# Patient Record
Sex: FEMALE | Race: WHITE | Employment: FULL TIME | URBAN - METROPOLITAN AREA
[De-identification: names, ages, dates, MRNs, and addresses within clinical notes are randomized per-mention and may not be internally consistent; named-entity substitution may affect disease eponyms.]

---

## 2017-01-31 ENCOUNTER — OFFICE VISIT (OUTPATIENT)
Dept: FAMILY MEDICINE CLINIC | Age: 52
End: 2017-01-31

## 2017-01-31 VITALS
RESPIRATION RATE: 14 BRPM | WEIGHT: 210 LBS | HEART RATE: 68 BPM | OXYGEN SATURATION: 97 % | SYSTOLIC BLOOD PRESSURE: 106 MMHG | HEIGHT: 61 IN | BODY MASS INDEX: 39.65 KG/M2 | DIASTOLIC BLOOD PRESSURE: 74 MMHG | TEMPERATURE: 99.8 F

## 2017-01-31 DIAGNOSIS — R05.9 COUGH: ICD-10-CM

## 2017-01-31 DIAGNOSIS — E11.9 TYPE 2 DIABETES MELLITUS WITHOUT COMPLICATION, WITHOUT LONG-TERM CURRENT USE OF INSULIN (HCC): Primary | ICD-10-CM

## 2017-01-31 DIAGNOSIS — F32.A DEPRESSION, UNSPECIFIED DEPRESSION TYPE: ICD-10-CM

## 2017-01-31 DIAGNOSIS — I10 ESSENTIAL HYPERTENSION: ICD-10-CM

## 2017-01-31 LAB
BILIRUBIN, POC: NORMAL
BLOOD URINE, POC: NORMAL
CLARITY, POC: NORMAL
COLOR, POC: NORMAL
GLUCOSE URINE, POC: NORMAL
KETONES, POC: NORMAL
LEUKOCYTE EST, POC: NORMAL
NITRITE, POC: NORMAL
PH, POC: 5.5
PROTEIN, POC: NORMAL
SPECIFIC GRAVITY, POC: 1.02
UROBILINOGEN, POC: NORMAL

## 2017-01-31 PROCEDURE — G8419 CALC BMI OUT NRM PARAM NOF/U: HCPCS | Performed by: FAMILY MEDICINE

## 2017-01-31 PROCEDURE — 3046F HEMOGLOBIN A1C LEVEL >9.0%: CPT | Performed by: FAMILY MEDICINE

## 2017-01-31 PROCEDURE — G8427 DOCREV CUR MEDS BY ELIG CLIN: HCPCS | Performed by: FAMILY MEDICINE

## 2017-01-31 PROCEDURE — 3017F COLORECTAL CA SCREEN DOC REV: CPT | Performed by: FAMILY MEDICINE

## 2017-01-31 PROCEDURE — 81003 URINALYSIS AUTO W/O SCOPE: CPT | Performed by: FAMILY MEDICINE

## 2017-01-31 PROCEDURE — G8484 FLU IMMUNIZE NO ADMIN: HCPCS | Performed by: FAMILY MEDICINE

## 2017-01-31 PROCEDURE — 99214 OFFICE O/P EST MOD 30 MIN: CPT | Performed by: FAMILY MEDICINE

## 2017-01-31 PROCEDURE — 1036F TOBACCO NON-USER: CPT | Performed by: FAMILY MEDICINE

## 2017-01-31 PROCEDURE — 3014F SCREEN MAMMO DOC REV: CPT | Performed by: FAMILY MEDICINE

## 2017-03-06 ENCOUNTER — OFFICE VISIT (OUTPATIENT)
Dept: FAMILY MEDICINE CLINIC | Age: 52
End: 2017-03-06

## 2017-03-06 VITALS
TEMPERATURE: 97.6 F | HEIGHT: 63 IN | RESPIRATION RATE: 16 BRPM | DIASTOLIC BLOOD PRESSURE: 72 MMHG | BODY MASS INDEX: 37.21 KG/M2 | HEART RATE: 68 BPM | WEIGHT: 210 LBS | SYSTOLIC BLOOD PRESSURE: 132 MMHG

## 2017-03-06 DIAGNOSIS — I10 ESSENTIAL HYPERTENSION: ICD-10-CM

## 2017-03-06 DIAGNOSIS — F32.A DEPRESSION, UNSPECIFIED DEPRESSION TYPE: ICD-10-CM

## 2017-03-06 DIAGNOSIS — Z12.31 VISIT FOR SCREENING MAMMOGRAM: ICD-10-CM

## 2017-03-06 DIAGNOSIS — E11.9 TYPE 2 DIABETES MELLITUS WITHOUT COMPLICATION, WITHOUT LONG-TERM CURRENT USE OF INSULIN (HCC): Primary | ICD-10-CM

## 2017-03-06 PROCEDURE — G8419 CALC BMI OUT NRM PARAM NOF/U: HCPCS | Performed by: FAMILY MEDICINE

## 2017-03-06 PROCEDURE — G8427 DOCREV CUR MEDS BY ELIG CLIN: HCPCS | Performed by: FAMILY MEDICINE

## 2017-03-06 PROCEDURE — 99214 OFFICE O/P EST MOD 30 MIN: CPT | Performed by: FAMILY MEDICINE

## 2017-03-06 PROCEDURE — 3014F SCREEN MAMMO DOC REV: CPT | Performed by: FAMILY MEDICINE

## 2017-03-06 PROCEDURE — G8484 FLU IMMUNIZE NO ADMIN: HCPCS | Performed by: FAMILY MEDICINE

## 2017-03-06 PROCEDURE — 3044F HG A1C LEVEL LT 7.0%: CPT | Performed by: FAMILY MEDICINE

## 2017-03-06 PROCEDURE — 3017F COLORECTAL CA SCREEN DOC REV: CPT | Performed by: FAMILY MEDICINE

## 2017-03-06 PROCEDURE — 1036F TOBACCO NON-USER: CPT | Performed by: FAMILY MEDICINE

## 2017-03-13 RX ORDER — AMITRIPTYLINE HYDROCHLORIDE 10 MG/1
10 TABLET, FILM COATED ORAL NIGHTLY
Qty: 30 TABLET | Refills: 3 | Status: SHIPPED | OUTPATIENT
Start: 2017-03-13 | End: 2017-05-09 | Stop reason: SDUPTHER

## 2017-03-13 RX ORDER — TRIAMTERENE AND HYDROCHLOROTHIAZIDE 37.5; 25 MG/1; MG/1
1 TABLET ORAL DAILY
Qty: 30 TABLET | Refills: 3 | Status: SHIPPED | OUTPATIENT
Start: 2017-03-13 | End: 2017-05-09 | Stop reason: SDUPTHER

## 2017-03-13 RX ORDER — CITALOPRAM 20 MG/1
20 TABLET ORAL DAILY
Qty: 30 TABLET | Refills: 3 | Status: SHIPPED | OUTPATIENT
Start: 2017-03-13 | End: 2017-05-09 | Stop reason: SDUPTHER

## 2017-03-21 ENCOUNTER — HOSPITAL ENCOUNTER (OUTPATIENT)
Dept: WOMENS IMAGING | Age: 52
Discharge: HOME | End: 2017-03-21
Payer: COMMERCIAL

## 2017-03-21 DIAGNOSIS — Z12.31 VISIT FOR SCREENING MAMMOGRAM: ICD-10-CM

## 2017-03-21 PROCEDURE — G0202 SCR MAMMO BI INCL CAD: HCPCS

## 2017-04-07 RX ORDER — GLIPIZIDE 2.5 MG/1
2.5 TABLET, EXTENDED RELEASE ORAL DAILY
Qty: 30 TABLET | Refills: 3 | Status: SHIPPED | OUTPATIENT
Start: 2017-04-07 | End: 2017-06-19 | Stop reason: DRUGHIGH

## 2017-04-18 ENCOUNTER — TELEPHONE (OUTPATIENT)
Dept: DIABETES SERVICES | Age: 52
End: 2017-04-18

## 2017-05-09 RX ORDER — AMITRIPTYLINE HYDROCHLORIDE 10 MG/1
10 TABLET, FILM COATED ORAL NIGHTLY
Qty: 30 TABLET | Refills: 3 | Status: SHIPPED | OUTPATIENT
Start: 2017-05-09 | End: 2017-08-17 | Stop reason: SDUPTHER

## 2017-05-09 RX ORDER — TRIAMTERENE AND HYDROCHLOROTHIAZIDE 37.5; 25 MG/1; MG/1
1 TABLET ORAL DAILY
Qty: 30 TABLET | Refills: 3 | Status: SHIPPED | OUTPATIENT
Start: 2017-05-09 | End: 2017-08-17 | Stop reason: SDUPTHER

## 2017-05-09 RX ORDER — CITALOPRAM 20 MG/1
20 TABLET ORAL DAILY
Qty: 30 TABLET | Refills: 3 | Status: SHIPPED | OUTPATIENT
Start: 2017-05-09 | End: 2017-09-08 | Stop reason: SDUPTHER

## 2017-05-09 RX ORDER — PANTOPRAZOLE SODIUM 40 MG/1
40 TABLET, DELAYED RELEASE ORAL DAILY
Qty: 30 TABLET | Refills: 3 | Status: SHIPPED | OUTPATIENT
Start: 2017-05-09 | End: 2017-08-17 | Stop reason: SDUPTHER

## 2017-05-09 RX ORDER — PROPRANOLOL HYDROCHLORIDE 20 MG/1
20 TABLET ORAL 2 TIMES DAILY
Qty: 180 TABLET | Refills: 1 | Status: SHIPPED | OUTPATIENT
Start: 2017-05-09 | End: 2017-09-08 | Stop reason: SDUPTHER

## 2017-06-02 DIAGNOSIS — I10 ESSENTIAL HYPERTENSION: Primary | ICD-10-CM

## 2017-06-02 DIAGNOSIS — E78.2 MIXED HYPERLIPIDEMIA: ICD-10-CM

## 2017-06-02 DIAGNOSIS — E11.9 TYPE 2 DIABETES MELLITUS WITHOUT COMPLICATION, WITHOUT LONG-TERM CURRENT USE OF INSULIN (HCC): ICD-10-CM

## 2017-06-05 DIAGNOSIS — E78.2 MIXED HYPERLIPIDEMIA: ICD-10-CM

## 2017-06-05 DIAGNOSIS — I10 ESSENTIAL HYPERTENSION: ICD-10-CM

## 2017-06-05 DIAGNOSIS — E11.9 TYPE 2 DIABETES MELLITUS WITHOUT COMPLICATION, WITHOUT LONG-TERM CURRENT USE OF INSULIN (HCC): ICD-10-CM

## 2017-06-05 LAB
ALBUMIN SERPL-MCNC: 3.9 G/DL (ref 3.9–4.9)
ALP BLD-CCNC: 85 U/L (ref 40–130)
ALT SERPL-CCNC: 26 U/L (ref 0–33)
ANION GAP SERPL CALCULATED.3IONS-SCNC: 14 MEQ/L (ref 7–13)
AST SERPL-CCNC: 21 U/L (ref 0–35)
BILIRUB SERPL-MCNC: 0.3 MG/DL (ref 0–1.2)
BUN BLDV-MCNC: 18 MG/DL (ref 6–20)
CALCIUM SERPL-MCNC: 9.2 MG/DL (ref 8.6–10.2)
CHLORIDE BLD-SCNC: 98 MEQ/L (ref 98–107)
CHOLESTEROL, TOTAL: 157 MG/DL (ref 0–199)
CO2: 26 MEQ/L (ref 22–29)
CREAT SERPL-MCNC: 0.77 MG/DL (ref 0.5–0.9)
GFR AFRICAN AMERICAN: >60
GFR NON-AFRICAN AMERICAN: >60
GLOBULIN: 2.6 G/DL (ref 2.3–3.5)
GLUCOSE BLD-MCNC: 208 MG/DL (ref 74–109)
HBA1C MFR BLD: 7.4 % (ref 4.8–5.9)
HDLC SERPL-MCNC: 38 MG/DL (ref 40–59)
LDL CHOLESTEROL CALCULATED: 86 MG/DL (ref 0–129)
POTASSIUM SERPL-SCNC: 3.8 MEQ/L (ref 3.5–5.1)
SODIUM BLD-SCNC: 138 MEQ/L (ref 132–144)
TOTAL PROTEIN: 6.5 G/DL (ref 6.4–8.1)
TRIGL SERPL-MCNC: 167 MG/DL (ref 0–200)

## 2017-06-19 ENCOUNTER — OFFICE VISIT (OUTPATIENT)
Dept: FAMILY MEDICINE CLINIC | Age: 52
End: 2017-06-19

## 2017-06-19 VITALS
HEIGHT: 63 IN | SYSTOLIC BLOOD PRESSURE: 116 MMHG | HEART RATE: 70 BPM | RESPIRATION RATE: 16 BRPM | TEMPERATURE: 98.2 F | DIASTOLIC BLOOD PRESSURE: 62 MMHG

## 2017-06-19 DIAGNOSIS — Z12.11 COLON CANCER SCREENING: ICD-10-CM

## 2017-06-19 DIAGNOSIS — F32.A DEPRESSION, UNSPECIFIED DEPRESSION TYPE: ICD-10-CM

## 2017-06-19 DIAGNOSIS — R06.83 SNORING: ICD-10-CM

## 2017-06-19 DIAGNOSIS — E11.9 TYPE 2 DIABETES MELLITUS WITHOUT COMPLICATION, WITHOUT LONG-TERM CURRENT USE OF INSULIN (HCC): Primary | ICD-10-CM

## 2017-06-19 DIAGNOSIS — I10 ESSENTIAL HYPERTENSION: ICD-10-CM

## 2017-06-19 PROCEDURE — 3017F COLORECTAL CA SCREEN DOC REV: CPT | Performed by: FAMILY MEDICINE

## 2017-06-19 PROCEDURE — G8427 DOCREV CUR MEDS BY ELIG CLIN: HCPCS | Performed by: FAMILY MEDICINE

## 2017-06-19 PROCEDURE — 99214 OFFICE O/P EST MOD 30 MIN: CPT | Performed by: FAMILY MEDICINE

## 2017-06-19 PROCEDURE — 3014F SCREEN MAMMO DOC REV: CPT | Performed by: FAMILY MEDICINE

## 2017-06-19 PROCEDURE — G8417 CALC BMI ABV UP PARAM F/U: HCPCS | Performed by: FAMILY MEDICINE

## 2017-06-19 PROCEDURE — 1036F TOBACCO NON-USER: CPT | Performed by: FAMILY MEDICINE

## 2017-06-19 PROCEDURE — 3046F HEMOGLOBIN A1C LEVEL >9.0%: CPT | Performed by: FAMILY MEDICINE

## 2017-06-19 RX ORDER — GLIPIZIDE 5 MG/1
5 TABLET ORAL DAILY
Qty: 90 TABLET | Refills: 1 | Status: SHIPPED | OUTPATIENT
Start: 2017-06-19 | End: 2017-09-08 | Stop reason: SDUPTHER

## 2017-07-14 ENCOUNTER — HOSPITAL ENCOUNTER (EMERGENCY)
Age: 52
Discharge: HOME | End: 2017-07-14
Attending: EMERGENCY MEDICINE
Payer: COMMERCIAL

## 2017-07-14 VITALS
RESPIRATION RATE: 16 BRPM | BODY MASS INDEX: 39.38 KG/M2 | HEART RATE: 71 BPM | WEIGHT: 214 LBS | DIASTOLIC BLOOD PRESSURE: 81 MMHG | OXYGEN SATURATION: 97 % | HEIGHT: 62 IN | TEMPERATURE: 98.9 F | SYSTOLIC BLOOD PRESSURE: 134 MMHG

## 2017-07-14 DIAGNOSIS — T78.40XA ALLERGIC REACTION, INITIAL ENCOUNTER: Primary | ICD-10-CM

## 2017-07-14 PROCEDURE — 99282 EMERGENCY DEPT VISIT SF MDM: CPT

## 2017-07-14 PROCEDURE — 6370000000 HC RX 637 (ALT 250 FOR IP): Performed by: EMERGENCY MEDICINE

## 2017-07-14 RX ORDER — PREDNISONE 20 MG/1
40 TABLET ORAL DAILY
Qty: 8 TABLET | Refills: 0 | Status: SHIPPED | OUTPATIENT
Start: 2017-07-14 | End: 2017-07-18

## 2017-07-14 RX ORDER — CETIRIZINE HYDROCHLORIDE 10 MG/1
10 TABLET ORAL ONCE
Status: COMPLETED | OUTPATIENT
Start: 2017-07-14 | End: 2017-07-14

## 2017-07-14 RX ORDER — PREDNISONE 20 MG/1
40 TABLET ORAL ONCE
Status: COMPLETED | OUTPATIENT
Start: 2017-07-14 | End: 2017-07-14

## 2017-07-14 RX ADMIN — CETIRIZINE HYDROCHLORIDE 10 MG: 10 TABLET, FILM COATED ORAL at 12:41

## 2017-07-14 RX ADMIN — PREDNISONE 40 MG: 20 TABLET ORAL at 12:41

## 2017-07-14 ASSESSMENT — ENCOUNTER SYMPTOMS
FACIAL SWELLING: 0
VOMITING: 0
CHEST TIGHTNESS: 0
SORE THROAT: 0
SHORTNESS OF BREATH: 0
VOICE CHANGE: 0
TROUBLE SWALLOWING: 0
ABDOMINAL PAIN: 0
EYE PAIN: 0
NAUSEA: 0

## 2017-07-14 ASSESSMENT — PAIN DESCRIPTION - ORIENTATION: ORIENTATION: RIGHT;LEFT

## 2017-07-14 ASSESSMENT — PAIN DESCRIPTION - LOCATION: LOCATION: HAND

## 2017-07-14 ASSESSMENT — PAIN SCALES - GENERAL: PAINLEVEL_OUTOF10: 5

## 2017-07-14 ASSESSMENT — PAIN DESCRIPTION - PAIN TYPE: TYPE: ACUTE PAIN

## 2017-08-15 ENCOUNTER — PATIENT MESSAGE (OUTPATIENT)
Dept: FAMILY MEDICINE CLINIC | Age: 52
End: 2017-08-15

## 2017-08-17 ENCOUNTER — TELEPHONE (OUTPATIENT)
Dept: FAMILY MEDICINE CLINIC | Age: 52
End: 2017-08-17

## 2017-08-17 RX ORDER — PANTOPRAZOLE SODIUM 40 MG/1
TABLET, DELAYED RELEASE ORAL
Qty: 30 TABLET | Refills: 0 | Status: SHIPPED | OUTPATIENT
Start: 2017-08-17 | End: 2017-09-06 | Stop reason: SDUPTHER

## 2017-08-17 RX ORDER — TRIAMTERENE AND HYDROCHLOROTHIAZIDE 37.5; 25 MG/1; MG/1
TABLET ORAL
Qty: 30 TABLET | Refills: 5 | Status: SHIPPED | OUTPATIENT
Start: 2017-08-17 | End: 2017-09-08 | Stop reason: SDUPTHER

## 2017-08-17 RX ORDER — AMITRIPTYLINE HYDROCHLORIDE 10 MG/1
TABLET, FILM COATED ORAL
Qty: 30 TABLET | Refills: 0 | Status: SHIPPED | OUTPATIENT
Start: 2017-08-17 | End: 2017-09-06 | Stop reason: SDUPTHER

## 2017-08-21 ENCOUNTER — TELEPHONE (OUTPATIENT)
Dept: FAMILY MEDICINE CLINIC | Age: 52
End: 2017-08-21

## 2017-08-21 DIAGNOSIS — E11.9 TYPE 2 DIABETES MELLITUS WITHOUT COMPLICATION, WITHOUT LONG-TERM CURRENT USE OF INSULIN (HCC): Primary | ICD-10-CM

## 2017-08-21 DIAGNOSIS — E11.9 TYPE 2 DIABETES MELLITUS WITHOUT COMPLICATION, WITHOUT LONG-TERM CURRENT USE OF INSULIN (HCC): ICD-10-CM

## 2017-08-21 RX ORDER — LANCETS 30 GAUGE
EACH MISCELLANEOUS
Qty: 100 EACH | Refills: 3 | Status: SHIPPED | OUTPATIENT
Start: 2017-08-21 | End: 2017-08-22

## 2017-08-22 ENCOUNTER — PATIENT MESSAGE (OUTPATIENT)
Dept: FAMILY MEDICINE CLINIC | Age: 52
End: 2017-08-22

## 2017-08-22 DIAGNOSIS — E11.9 TYPE 2 DIABETES MELLITUS WITHOUT COMPLICATION, WITHOUT LONG-TERM CURRENT USE OF INSULIN (HCC): Primary | ICD-10-CM

## 2017-09-06 RX ORDER — AMITRIPTYLINE HYDROCHLORIDE 10 MG/1
TABLET, FILM COATED ORAL
Qty: 30 TABLET | Refills: 5 | Status: SHIPPED | OUTPATIENT
Start: 2017-09-06 | End: 2017-09-08 | Stop reason: SDUPTHER

## 2017-09-06 RX ORDER — PANTOPRAZOLE SODIUM 40 MG/1
TABLET, DELAYED RELEASE ORAL
Qty: 30 TABLET | Refills: 3 | Status: SHIPPED | OUTPATIENT
Start: 2017-09-06 | End: 2017-09-08 | Stop reason: SDUPTHER

## 2017-09-08 DIAGNOSIS — E11.9 TYPE 2 DIABETES MELLITUS WITHOUT COMPLICATION, WITHOUT LONG-TERM CURRENT USE OF INSULIN (HCC): ICD-10-CM

## 2017-09-08 RX ORDER — PROPRANOLOL HYDROCHLORIDE 20 MG/1
20 TABLET ORAL 2 TIMES DAILY
Qty: 180 TABLET | Refills: 1 | Status: SHIPPED | OUTPATIENT
Start: 2017-09-08 | End: 2018-03-07 | Stop reason: SDUPTHER

## 2017-09-08 RX ORDER — GLIPIZIDE 5 MG/1
5 TABLET ORAL DAILY
Qty: 90 TABLET | Refills: 1 | Status: SHIPPED | OUTPATIENT
Start: 2017-09-08 | End: 2017-10-23 | Stop reason: SDUPTHER

## 2017-09-08 RX ORDER — TRIAMTERENE AND HYDROCHLOROTHIAZIDE 37.5; 25 MG/1; MG/1
TABLET ORAL
Qty: 90 TABLET | Refills: 1 | Status: SHIPPED | OUTPATIENT
Start: 2017-09-08 | End: 2018-03-15 | Stop reason: SDUPTHER

## 2017-09-08 RX ORDER — PANTOPRAZOLE SODIUM 40 MG/1
TABLET, DELAYED RELEASE ORAL
Qty: 90 TABLET | Refills: 1 | Status: SHIPPED | OUTPATIENT
Start: 2017-09-08 | End: 2018-03-15 | Stop reason: SDUPTHER

## 2017-09-08 RX ORDER — CITALOPRAM 20 MG/1
20 TABLET ORAL DAILY
Qty: 90 TABLET | Refills: 1 | Status: SHIPPED | OUTPATIENT
Start: 2017-09-08 | End: 2018-03-15 | Stop reason: SDUPTHER

## 2017-09-08 RX ORDER — AMITRIPTYLINE HYDROCHLORIDE 10 MG/1
TABLET, FILM COATED ORAL
Qty: 90 TABLET | Refills: 1 | Status: SHIPPED | OUTPATIENT
Start: 2017-09-08 | End: 2018-03-15 | Stop reason: SDUPTHER

## 2017-10-19 DIAGNOSIS — E11.9 TYPE 2 DIABETES MELLITUS WITHOUT COMPLICATION, WITHOUT LONG-TERM CURRENT USE OF INSULIN (HCC): ICD-10-CM

## 2017-10-19 DIAGNOSIS — I10 ESSENTIAL HYPERTENSION: Primary | ICD-10-CM

## 2017-10-20 DIAGNOSIS — E11.9 TYPE 2 DIABETES MELLITUS WITHOUT COMPLICATION, WITHOUT LONG-TERM CURRENT USE OF INSULIN (HCC): ICD-10-CM

## 2017-10-20 DIAGNOSIS — I10 ESSENTIAL HYPERTENSION: ICD-10-CM

## 2017-10-20 LAB
ALBUMIN SERPL-MCNC: 4.1 G/DL (ref 3.9–4.9)
ALP BLD-CCNC: 74 U/L (ref 40–130)
ALT SERPL-CCNC: 27 U/L (ref 0–33)
ANION GAP SERPL CALCULATED.3IONS-SCNC: 12 MEQ/L (ref 7–13)
AST SERPL-CCNC: 22 U/L (ref 0–35)
BILIRUB SERPL-MCNC: 0.4 MG/DL (ref 0–1.2)
BUN BLDV-MCNC: 19 MG/DL (ref 6–20)
CALCIUM SERPL-MCNC: 9.6 MG/DL (ref 8.6–10.2)
CHLORIDE BLD-SCNC: 98 MEQ/L (ref 98–107)
CO2: 29 MEQ/L (ref 22–29)
CREAT SERPL-MCNC: 0.7 MG/DL (ref 0.5–0.9)
GFR AFRICAN AMERICAN: >60
GFR NON-AFRICAN AMERICAN: >60
GLOBULIN: 2.6 G/DL (ref 2.3–3.5)
GLUCOSE BLD-MCNC: 123 MG/DL (ref 74–109)
HBA1C MFR BLD: 7.7 % (ref 4.8–5.9)
POTASSIUM SERPL-SCNC: 3.8 MEQ/L (ref 3.5–5.1)
SODIUM BLD-SCNC: 139 MEQ/L (ref 132–144)
TOTAL PROTEIN: 6.7 G/DL (ref 6.4–8.1)

## 2017-10-23 ENCOUNTER — OFFICE VISIT (OUTPATIENT)
Dept: FAMILY MEDICINE CLINIC | Age: 52
End: 2017-10-23

## 2017-10-23 VITALS
HEART RATE: 72 BPM | DIASTOLIC BLOOD PRESSURE: 66 MMHG | BODY MASS INDEX: 38.98 KG/M2 | WEIGHT: 220 LBS | TEMPERATURE: 97.4 F | SYSTOLIC BLOOD PRESSURE: 128 MMHG | HEIGHT: 63 IN | RESPIRATION RATE: 16 BRPM

## 2017-10-23 DIAGNOSIS — E11.9 TYPE 2 DIABETES MELLITUS WITHOUT COMPLICATION, WITHOUT LONG-TERM CURRENT USE OF INSULIN (HCC): Primary | ICD-10-CM

## 2017-10-23 DIAGNOSIS — Z12.12 ENCOUNTER FOR COLORECTAL CANCER SCREENING: ICD-10-CM

## 2017-10-23 DIAGNOSIS — I10 ESSENTIAL HYPERTENSION: ICD-10-CM

## 2017-10-23 DIAGNOSIS — F32.A DEPRESSION, UNSPECIFIED DEPRESSION TYPE: ICD-10-CM

## 2017-10-23 DIAGNOSIS — Z12.11 ENCOUNTER FOR COLORECTAL CANCER SCREENING: ICD-10-CM

## 2017-10-23 PROCEDURE — G8417 CALC BMI ABV UP PARAM F/U: HCPCS | Performed by: FAMILY MEDICINE

## 2017-10-23 PROCEDURE — 3045F PR MOST RECENT HEMOGLOBIN A1C LEVEL 7.0-9.0%: CPT | Performed by: FAMILY MEDICINE

## 2017-10-23 PROCEDURE — G8427 DOCREV CUR MEDS BY ELIG CLIN: HCPCS | Performed by: FAMILY MEDICINE

## 2017-10-23 PROCEDURE — 3017F COLORECTAL CA SCREEN DOC REV: CPT | Performed by: FAMILY MEDICINE

## 2017-10-23 PROCEDURE — 99214 OFFICE O/P EST MOD 30 MIN: CPT | Performed by: FAMILY MEDICINE

## 2017-10-23 PROCEDURE — G8484 FLU IMMUNIZE NO ADMIN: HCPCS | Performed by: FAMILY MEDICINE

## 2017-10-23 PROCEDURE — 3014F SCREEN MAMMO DOC REV: CPT | Performed by: FAMILY MEDICINE

## 2017-10-23 PROCEDURE — 1036F TOBACCO NON-USER: CPT | Performed by: FAMILY MEDICINE

## 2017-10-23 RX ORDER — GLIPIZIDE 5 MG/1
5 TABLET ORAL
Qty: 180 TABLET | Refills: 3 | Status: SHIPPED | OUTPATIENT
Start: 2017-10-23 | End: 2018-07-09 | Stop reason: CLARIF

## 2017-10-23 RX ORDER — GLIPIZIDE 5 MG/1
5 TABLET ORAL
Qty: 180 TABLET | Refills: 3 | Status: SHIPPED | OUTPATIENT
Start: 2017-10-23 | End: 2017-10-23 | Stop reason: SDUPTHER

## 2017-10-23 NOTE — PROGRESS NOTES
Chief Complaint   Patient presents with    Diabetes Mellitus    Hypertension     Carri Huerta is here for a diabetic follow up    KKA2N=2.7    LDL= 86  Increasing glucotrol to 7.5mg possibly 10mg if needed  And known hx murmur-hx echo    Sugars most recently have been running-   HIGH & LOW <180  Activity level-     advised to increase  The patient denies history of       seizures,             heart attack or KNOWN CAD       or stroke. No chest pain, shortness of breath, paroxysmal nocturnal dyspnea. No nausea, vomiting, diarrhea, hematochezia or melena. No paresthesias or headaches      No dysuria, frequency or hematuria.     Past Medical History:   Diagnosis Date    Depression     DM2 (diabetes mellitus, type 2) ([address]     HTN (hypertension)      Past Surgical History:   Procedure Laterality Date     SECTION  1982/3/96    x3    TONSILLECTOMY  age 8     Social History     Social History    Marital status: Single     Spouse name: N/A    Number of children: N/A    Years of education: N/A     Social History Main Topics    Smoking status: Never Smoker    Smokeless tobacco: Never Used    Alcohol use 0.0 oz/week      Comment: occ    Drug use: Unknown    Sexual activity: Yes     Other Topics Concern    None     Social History Narrative    None     Orders Only on 10/20/2017   Component Date Value Ref Range Status    Hemoglobin A1C 10/20/2017 7.7* 4.8 - 5.9 % Final    Sodium 10/20/2017 139  132 - 144 mEq/L Final    Potassium 10/20/2017 3.8  3.5 - 5.1 mEq/L Final    Chloride 10/20/2017 98  98 - 107 mEq/L Final    CO2 10/20/2017 29  22 - 29 mEq/L Final    Anion Gap 10/20/2017 12  7 - 13 mEq/L Final    Glucose 10/20/2017 123* 74 - 109 mg/dL Final    BUN 10/20/2017 19  6 - 20 mg/dL Final    CREATININE 10/20/2017 0.70  0.50 - 0.90 mg/dL Final    GFR Non- 10/20/2017 >60.0  >60 Final    Comment: >60 mL/min/1.73m2 EGFR, calc. for ages 25 and older using the  MDRD adenopathy palpable. No asymmetry visible. No thyromegaly. No bruits. RESPIRATORY:  Equal breath sounds / no acute respiratory distress. No wheezes,rales, or rhonchi        HEART:  Regular rhythm with 2/6 sys LSB murmur, NO rub or gallop. ABDOMEN:  overwt Soft, nontender. No masses, guarding or rebound. Normoactive bowel sounds. EXTREMITIES:   No edema in any extremity. No cyanosis or clubbing. 2+ dorsalis pedis pulses bilaterally        FOOT EXAM:    A comprehensive foot exam was performed including monofilament testing or equivalent for sensation. Normal foot, ankle, and digit range of motion and strength. Patient was found to have intact sensation, 2+ dorsalis pedis pulses and no concerning calluses, rashes, sores or foot lesions of any kind. No obvious neuropathy. 1. Type 2 diabetes mellitus without complication, without long-term current use of insulin ([address]     2. Essential hypertension     3. Depression, unspecified depression type     4. Encounter for colorectal cancer screening  Stephani Cantrell MD     Had been trying w diet/exercise    And again had a lengthy discussion w pt  about risks of poorly controlled diabetes including micro and macrovascular complications of DM2 including blindness,MI,CVA and death among other possibilities. Pt aware and agrees to better compliance and adherance to instructions such as regular eye exams q 1-2 y, foot exams,and f/u regularly for hba1c with a goal of 6.5.     NO evidence of neuropathy or nephropathy at this point    Follow up as planned for hba1c and BP checks    Sooner if condition deteriorates or problems arise    Sheila Mccray MD

## 2017-11-02 ENCOUNTER — OFFICE VISIT (OUTPATIENT)
Dept: FAMILY MEDICINE CLINIC | Age: 52
End: 2017-11-02

## 2017-11-02 VITALS
BODY MASS INDEX: 40.67 KG/M2 | OXYGEN SATURATION: 98 % | TEMPERATURE: 97.8 F | HEART RATE: 78 BPM | WEIGHT: 221 LBS | DIASTOLIC BLOOD PRESSURE: 70 MMHG | HEIGHT: 62 IN | SYSTOLIC BLOOD PRESSURE: 110 MMHG | RESPIRATION RATE: 18 BRPM

## 2017-11-02 DIAGNOSIS — S51.011A LACERATION OF RIGHT ELBOW, INITIAL ENCOUNTER: Primary | ICD-10-CM

## 2017-11-02 PROCEDURE — G8417 CALC BMI ABV UP PARAM F/U: HCPCS | Performed by: NURSE PRACTITIONER

## 2017-11-02 PROCEDURE — 99213 OFFICE O/P EST LOW 20 MIN: CPT | Performed by: NURSE PRACTITIONER

## 2017-11-02 PROCEDURE — 3017F COLORECTAL CA SCREEN DOC REV: CPT | Performed by: NURSE PRACTITIONER

## 2017-11-02 PROCEDURE — G8427 DOCREV CUR MEDS BY ELIG CLIN: HCPCS | Performed by: NURSE PRACTITIONER

## 2017-11-02 PROCEDURE — G8484 FLU IMMUNIZE NO ADMIN: HCPCS | Performed by: NURSE PRACTITIONER

## 2017-11-02 PROCEDURE — 3014F SCREEN MAMMO DOC REV: CPT | Performed by: NURSE PRACTITIONER

## 2017-11-02 PROCEDURE — 1036F TOBACCO NON-USER: CPT | Performed by: NURSE PRACTITIONER

## 2017-11-02 RX ORDER — CEPHALEXIN 500 MG/1
500 CAPSULE ORAL 3 TIMES DAILY
Qty: 21 CAPSULE | Refills: 0 | Status: SHIPPED | OUTPATIENT
Start: 2017-11-02 | End: 2017-11-09

## 2017-11-02 NOTE — PROGRESS NOTES
[address] [facility], 45 y.o. female presents today with:  Chief Complaint   Patient presents with   Lupton Brown Fall     pt comes in today after she fell on her r elbow this am while walking her dog---pt has a large gash and would like it lookes at---ah       Laceration    The incident occurred 3 to 6 hours ago. Pain location: right elbow. The laceration is 3 cm in size. Injury mechanism: fall to ground. The pain is at a severity of 3/10. The pain is mild. The pain has been constant since onset. She reports no foreign bodies present. The patient was walking her dog this morning and fell the patient lacerated her elbow through a sweatshirt. The patient states that she cleaned the area and applied bandaids. Objective    Vitals:    11/02/17 1553   BP: 110/70   Site: Left Arm   Position: Sitting   Cuff Size: Large Adult   Pulse: 78   Resp: 18   Temp: 97.8 °F (36.6 °C)   TempSrc: Temporal   SpO2: 98%   Weight: 221 lb (100.2 kg)   Height: 5' 2\" (1.575 m)       Physical Exam   Constitutional: She is oriented to person, place, and time. She appears well-developed and well-nourished. HENT:   Head: Normocephalic and atraumatic. Eyes: Conjunctivae and EOM are normal.   Neck: Normal range of motion. Pulmonary/Chest: Effort normal.   Musculoskeletal: Normal range of motion. Right forearm: She exhibits laceration. Arms:  Neurological: She is alert and oriented to person, place, and time. Skin: Skin is warm and dry. Psychiatric: She has a normal mood and affect. Assessment & Plan   1. Laceration of right elbow, initial encounter  Dressing    cephALEXin (KEFLEX) 500 MG capsule    mupirocin (BACTROBAN) 2 % ointment    Area cleansed with saline and peroxide, steri strips applied with DSD. Wound care instruc given, prophylactic atb given secondary to DM       Return if symptoms worsen or fail to improve. Reviewed with the patient: current clinical status, medications, activities and diet. Side effects, adverse effects of the medication prescribed today, as well as treatment plan/ rationale and result expectations have been discussed with the patient who expresses understanding and desires to proceed. Close follow up to evaluate treatment results and for coordination of care. I have reviewed the patient's medical history in detail and updated the computerized patient record.     Kerry Santana NP

## 2017-11-07 ENCOUNTER — OFFICE VISIT (OUTPATIENT)
Dept: FAMILY MEDICINE CLINIC | Age: 52
End: 2017-11-07

## 2017-11-07 VITALS
DIASTOLIC BLOOD PRESSURE: 62 MMHG | BODY MASS INDEX: 40.88 KG/M2 | SYSTOLIC BLOOD PRESSURE: 122 MMHG | HEIGHT: 62 IN | HEART RATE: 80 BPM | RESPIRATION RATE: 16 BRPM | TEMPERATURE: 97.8 F | WEIGHT: 222.13 LBS | OXYGEN SATURATION: 99 %

## 2017-11-07 DIAGNOSIS — B02.9 HERPES ZOSTER WITHOUT COMPLICATION: Primary | ICD-10-CM

## 2017-11-07 DIAGNOSIS — L03.113 CELLULITIS OF RIGHT ELBOW: ICD-10-CM

## 2017-11-07 PROCEDURE — 1036F TOBACCO NON-USER: CPT | Performed by: NURSE PRACTITIONER

## 2017-11-07 PROCEDURE — G8427 DOCREV CUR MEDS BY ELIG CLIN: HCPCS | Performed by: NURSE PRACTITIONER

## 2017-11-07 PROCEDURE — 3017F COLORECTAL CA SCREEN DOC REV: CPT | Performed by: NURSE PRACTITIONER

## 2017-11-07 PROCEDURE — G8417 CALC BMI ABV UP PARAM F/U: HCPCS | Performed by: NURSE PRACTITIONER

## 2017-11-07 PROCEDURE — G8484 FLU IMMUNIZE NO ADMIN: HCPCS | Performed by: NURSE PRACTITIONER

## 2017-11-07 PROCEDURE — 3014F SCREEN MAMMO DOC REV: CPT | Performed by: NURSE PRACTITIONER

## 2017-11-07 PROCEDURE — 99213 OFFICE O/P EST LOW 20 MIN: CPT | Performed by: NURSE PRACTITIONER

## 2017-11-07 RX ORDER — SULFAMETHOXAZOLE AND TRIMETHOPRIM 800; 160 MG/1; MG/1
1 TABLET ORAL 2 TIMES DAILY
Qty: 20 TABLET | Refills: 0 | Status: SHIPPED | OUTPATIENT
Start: 2017-11-07 | End: 2017-11-10 | Stop reason: SINTOL

## 2017-11-07 RX ORDER — CAPSAICIN 0.07 G/100G
CREAM TOPICAL
Qty: 1 TUBE | Refills: 0 | Status: SHIPPED | OUTPATIENT
Start: 2017-11-07 | End: 2017-12-07

## 2017-11-07 RX ORDER — VALACYCLOVIR HYDROCHLORIDE 1 G/1
1000 TABLET, FILM COATED ORAL 3 TIMES DAILY
Qty: 21 TABLET | Refills: 0 | Status: SHIPPED | OUTPATIENT
Start: 2017-11-07 | End: 2017-11-14

## 2017-11-10 ENCOUNTER — OFFICE VISIT (OUTPATIENT)
Dept: FAMILY MEDICINE CLINIC | Age: 52
End: 2017-11-10

## 2017-11-10 VITALS
DIASTOLIC BLOOD PRESSURE: 74 MMHG | HEIGHT: 62 IN | HEART RATE: 80 BPM | BODY MASS INDEX: 40.85 KG/M2 | RESPIRATION RATE: 16 BRPM | SYSTOLIC BLOOD PRESSURE: 128 MMHG | TEMPERATURE: 97 F | WEIGHT: 222 LBS

## 2017-11-10 DIAGNOSIS — F32.A DEPRESSION, UNSPECIFIED DEPRESSION TYPE: ICD-10-CM

## 2017-11-10 DIAGNOSIS — B02.9 HERPES ZOSTER WITHOUT COMPLICATION: ICD-10-CM

## 2017-11-10 DIAGNOSIS — E11.9 TYPE 2 DIABETES MELLITUS WITHOUT COMPLICATION, WITHOUT LONG-TERM CURRENT USE OF INSULIN (HCC): Primary | ICD-10-CM

## 2017-11-10 DIAGNOSIS — I10 ESSENTIAL HYPERTENSION: ICD-10-CM

## 2017-11-10 DIAGNOSIS — L23.9 ALLERGIC DERMATITIS: ICD-10-CM

## 2017-11-10 PROCEDURE — 99213 OFFICE O/P EST LOW 20 MIN: CPT | Performed by: FAMILY MEDICINE

## 2017-11-10 PROCEDURE — 3045F PR MOST RECENT HEMOGLOBIN A1C LEVEL 7.0-9.0%: CPT | Performed by: FAMILY MEDICINE

## 2017-11-10 PROCEDURE — 3017F COLORECTAL CA SCREEN DOC REV: CPT | Performed by: FAMILY MEDICINE

## 2017-11-10 PROCEDURE — G8484 FLU IMMUNIZE NO ADMIN: HCPCS | Performed by: FAMILY MEDICINE

## 2017-11-10 PROCEDURE — 3014F SCREEN MAMMO DOC REV: CPT | Performed by: FAMILY MEDICINE

## 2017-11-10 PROCEDURE — G8417 CALC BMI ABV UP PARAM F/U: HCPCS | Performed by: FAMILY MEDICINE

## 2017-11-10 PROCEDURE — 1036F TOBACCO NON-USER: CPT | Performed by: FAMILY MEDICINE

## 2017-11-10 PROCEDURE — G8427 DOCREV CUR MEDS BY ELIG CLIN: HCPCS | Performed by: FAMILY MEDICINE

## 2017-11-10 NOTE — PATIENT INSTRUCTIONS
Patient Education        Shingles: Care Instructions  Your Care Instructions    Shingles (herpes zoster) causes pain and a blistered rash. The rash can appear anywhere on the body but will be on only one side of the body, the left or right. It will be in a band, a strip, or a small area. The pain can be very severe. Shingles can also cause tingling or itching in the area of the rash. The blisters scab over after a few days and heal in 2 to 4 weeks. Medicines can help you feel better and may help prevent more serious problems caused by shingles. Shingles is caused by the same virus that causes chickenpox. When you have chickenpox, the virus gets into your nerve roots and stays there (becomes dormant) long after you get over the chickenpox. If the virus becomes active again, it can cause shingles. Follow-up care is a key part of your treatment and safety. Be sure to make and go to all appointments, and call your doctor if you are having problems. It's also a good idea to know your test results and keep a list of the medicines you take. How can you care for yourself at home? · Be safe with medicines. Take your medicines exactly as prescribed. Call your doctor if you think you are having a problem with your medicine. Antiviral medicine helps you get better faster. · Try not to scratch or pick at the blisters. They will crust over and fall off on their own if you leave them alone. · Put cool, wet cloths on the area to relieve pain and itching. You can also use calamine lotion. Try not to use so much lotion that it cakes and is hard to get off. · Put cornstarch or baking soda on the sores to help dry them out so they heal faster. · Do not use thick ointment, such as petroleum jelly, on the sores. This will keep them from drying and healing. · To help remove loose crusts, soak them in tap water. This can help decrease oozing, and dry and soothe the skin.   · Take an over-the-counter pain medicine, such as

## 2017-11-10 NOTE — PROGRESS NOTES
Subjective  Gómez Son, 45 y.o. female presents today with:  Chief Complaint   Patient presents with    Herpes Zoster     left axilla    Allergic Reaction     to bactrim--pt d/c'd    Fall     laceration on right elbow    Other     pt is seeing GI  for colonoscopy consult       hba1c 7.7 ldl 86    Past Medical History:   Diagnosis Date    Depression     DM2 (diabetes mellitus, type 2) ([address]     HTN (hypertension)      Past Surgical History:   Procedure Laterality Date     SECTION  1982/3/96    x3    TONSILLECTOMY  age 8     Social History     Social History    Marital status: Single     Spouse name: N/A    Number of children: N/A    Years of education: N/A     Occupational History    Not on file. Social History Main Topics    Smoking status: Never Smoker    Smokeless tobacco: Never Used    Alcohol use 0.0 oz/week      Comment: occ    Drug use: Unknown    Sexual activity: Yes     Other Topics Concern    Not on file     Social History Narrative    No narrative on file     Family History   Problem Relation Age of Onset    Stroke Mother     Kidney Disease Mother     Heart Disease Father     Heart Disease Sister     Diabetes Sister     Heart Disease Brother      massive MI     Allergies   Allergen Reactions    Sesame Seed (Diagnostic)     Bactrim [Sulfamethoxazole-Trimethoprim] Rash     Current Outpatient Prescriptions   Medication Sig Dispense Refill    valACYclovir (VALTREX) 1 g tablet Take 1 tablet by mouth 3 times daily for 7 days For Shingles 21 tablet 0    capsicum (ZOSTRIX) 0.075 % topical cream Apply topically 3 times daily.  1 Tube 0    mupirocin (BACTROBAN) 2 % ointment Apply topically 2 times daily 15 g 0    glipiZIDE (GLUCOTROL) 5 MG tablet Take 1 tablet by mouth 2 times daily (before meals) 180 tablet 3    amitriptyline (ELAVIL) 10 MG tablet Take 1 tablet by mouth nightly 90 tablet 1    citalopram (CELEXA) 20 MG tablet Take 1 tablet by mouth daily 90 mL/min/1.73m2 EGFR, calc. for ages 25 and older using the  MDRD formula (not corrected for weight), is valid for stable  renal function.  Calcium 10/20/2017 9.6  8.6 - 10.2 mg/dL Final    Total Protein 10/20/2017 6.7  6.4 - 8.1 g/dL Final    Alb 10/20/2017 4.1  3.9 - 4.9 g/dL Final    Total Bilirubin 10/20/2017 0.4  0.0 - 1.2 mg/dL Final    Alkaline Phosphatase 10/20/2017 74  40 - 130 U/L Final    ALT 10/20/2017 27  0 - 33 U/L Final    AST 10/20/2017 22  0 - 35 U/L Final    Globulin 10/20/2017 2.6  2.3 - 3.5 g/dL Final     Health Maintenance   Topic Date Due    Hepatitis C screen  1965    Diabetic retinal exam  04/06/1973    HIV screen  04/06/1978    Colon cancer screen colonoscopy  04/06/2013    DTaP/Tdap/Td vaccine (1 - Tdap) 12/19/2017 (Originally 4/6/1982)    Pneumococcal med risk (1 of 1 - PPSV23) 01/31/2018 (Originally 4/6/1982)    Flu vaccine (1) 10/23/2018 (Originally 9/1/2017)    Diabetic microalbuminuria test  01/31/2018    Lipid screen  06/05/2018    Diabetic foot exam  06/19/2018    Diabetic hemoglobin A1C test  10/20/2018    Cervical cancer screen  12/15/2018    Breast cancer screen  03/21/2019       No results found for this visit on 11/10/17. Objective    Vitals:    11/10/17 1032   BP: 128/74   Pulse: 80   Resp: 16   Temp: 97 °F (36.1 °C)   TempSrc: Tympanic   Weight: 222 lb (100.7 kg)   Height: 5' 2\" (1.575 m)       PHYSICAL EXAMINATION:        GENERAL:    The patient appears well nourished and well-developed,     Normal affect. Not appearing significantly anxious or depressed. No acute respiratory distress. Alert and oriented times 3. Skin:     r elbow healing scab 4cm lac  No cellulitis minimal surrounding erythema  L axilla 5x10 cm rash-vesicular in dermatomal pattern  Some lesions scabbed      No concerning moles observed. Gait:    Normal gait. No ataxia. HEENT:  Normocephalic, atraumatic.     Throat:  Pharynx is clear, no erythema/ edema or

## 2017-11-13 ASSESSMENT — ENCOUNTER SYMPTOMS
TROUBLE SWALLOWING: 0
WHEEZING: 0
SORE THROAT: 0
NAIL CHANGES: 0
VOMITING: 0
DIARRHEA: 0
RHINORRHEA: 0
NAUSEA: 0
COUGH: 0
EYE PAIN: 0
SHORTNESS OF BREATH: 0
ABDOMINAL PAIN: 0

## 2017-11-13 NOTE — PROGRESS NOTES
Subjective:      Patient ID: Norma Hope is a 45 y.o. female who presents today for:  Chief Complaint   Patient presents with    Rash     x3-4 days. complains of rash in left armpit region. complains of burning and itching. is concerned it might be a reaction from her antibiotic medication but is not sure. denies trying any OTC medications for rash. Rash   This is a new problem. Episode onset: x 3 days. The problem has been gradually worsening since onset. The affected locations include the left axilla. The rash is characterized by blistering, burning and itchiness. She was exposed to nothing. Pertinent negatives include no anorexia, congestion, cough, diarrhea, eye pain, facial edema, fatigue, fever, joint pain, nail changes, rhinorrhea, shortness of breath, sore throat or vomiting. Past treatments include nothing. There is no history of eczema.        Past Medical History:   Diagnosis Date    Depression     [facility]2 (di[facility] mellitus, type 2) ([facility]     HTN (hypertension)      Current Outpatient Prescriptions on File Prior to Visit   Medication Sig Dispense Refill    mupirocin (BACTROBAN) 2 % ointment Apply topically 2 times daily 15 g 0    glipiZIDE (GLUCOTROL) 5 MG tablet Take 1 tablet by mouth 2 times daily (before meals) 180 tablet 3    amitriptyline (ELAVIL) 10 MG tablet Take 1 tablet by mouth nightly 90 tablet 1    citalopram (CELEXA) 20 MG tablet Take 1 tablet by mouth daily 90 tablet 1    propranolol (INDERAL) 20 MG tablet Take 1 tablet by mouth 2 times daily 180 tablet 1    triamterene-hydrochlorothiazide (MAXZIDE-25) 37.5-25 MG per tablet Take 1 tablet by mouth daily 90 tablet 1    pantoprazole (PROTONIX) 40 MG tablet Take 1 tablet by mouth daily 90 tablet 1    exenatide (BYETTA 5 MCG PEN) 5 MCG/0.02ML injection Inject 0.02 mLs into the skin 2 times daily (with meals) 3 Pen 1    Insulin Pen Needle (B-D ULTRAFINE III SHORT PEN) 31G X 8 MM MISC 1 each by Does not apply route daily 100 each 3    B-D ULTRA FINE LANCETS MISC Test daily 100 each 1    Glucose Blood (BLOOD GLUCOSE TEST STRIPS) STRP Test bid and prn 100 strip 3    aspirin 81 MG tablet Take 81 mg by mouth daily      Melatonin 1 MG CAPS Take by mouth       No current facility-administered medications on file prior to visit. Past Surgical History:   Procedure Laterality Date     SECTION  1982/3/96    x3    TONSILLECTOMY  age 8     Family History   Problem Relation Age of Onset    Stroke Mother     Kidney Disease Mother     Heart Disease Father     Heart Disease Sister     Diabetes Sister     Heart Disease Brother      massive MI     Social History     Social History    Marital status: Single     Spouse name: N/A    Number of children: N/A    Years of education: N/A     Occupational History    Not on file. Social History Main Topics    Smoking status: Never Smoker    Smokeless tobacco: Never Used    Alcohol use 0.0 oz/week      Comment: occ    Drug use: Unknown    Sexual activity: Yes     Other Topics Concern    Not on file     Social History Narrative    No narrative on file     Allergies:  Sesame seed (diagnostic) and Bactrim [sulfamethoxazole-trimethoprim]    Review of Systems   Constitutional: Negative for activity change, appetite change, chills, diaphoresis, fatigue and fever. HENT: Negative for congestion, ear pain, rhinorrhea, sore throat and trouble swallowing. Eyes: Negative for pain and visual disturbance. Respiratory: Negative for cough, shortness of breath and wheezing. Cardiovascular: Negative for chest pain and palpitations. Gastrointestinal: Negative for abdominal pain, anorexia, diarrhea, nausea and vomiting. Musculoskeletal: Negative for joint pain. Skin: Positive for rash and wound (right elbow). Negative for nail changes. Neurological: Negative for dizziness, syncope, weakness, light-headedness and headaches.        Objective:   /62   Pulse 80   Temp 97.8

## 2017-11-14 ENCOUNTER — OFFICE VISIT (OUTPATIENT)
Dept: SURGERY | Age: 52
End: 2017-11-14

## 2017-11-14 VITALS
DIASTOLIC BLOOD PRESSURE: 74 MMHG | SYSTOLIC BLOOD PRESSURE: 124 MMHG | BODY MASS INDEX: 40.67 KG/M2 | HEIGHT: 62 IN | WEIGHT: 221 LBS

## 2017-11-14 DIAGNOSIS — K63.5 POLYP OF COLON, UNSPECIFIED PART OF COLON, UNSPECIFIED TYPE: Primary | ICD-10-CM

## 2017-11-14 PROCEDURE — 3014F SCREEN MAMMO DOC REV: CPT | Performed by: COLON & RECTAL SURGERY

## 2017-11-14 PROCEDURE — G8484 FLU IMMUNIZE NO ADMIN: HCPCS | Performed by: COLON & RECTAL SURGERY

## 2017-11-14 PROCEDURE — G8427 DOCREV CUR MEDS BY ELIG CLIN: HCPCS | Performed by: COLON & RECTAL SURGERY

## 2017-11-14 PROCEDURE — 3017F COLORECTAL CA SCREEN DOC REV: CPT | Performed by: COLON & RECTAL SURGERY

## 2017-11-14 PROCEDURE — 99203 OFFICE O/P NEW LOW 30 MIN: CPT | Performed by: COLON & RECTAL SURGERY

## 2017-11-14 PROCEDURE — G8417 CALC BMI ABV UP PARAM F/U: HCPCS | Performed by: COLON & RECTAL SURGERY

## 2017-11-14 ASSESSMENT — ENCOUNTER SYMPTOMS
EYES NEGATIVE: 1
GASTROINTESTINAL NEGATIVE: 1
RESPIRATORY NEGATIVE: 1

## 2017-11-14 NOTE — PROGRESS NOTES
ointment Apply topically 2 times daily 15 g 0    glipiZIDE (GLUCOTROL) 5 MG tablet Take 1 tablet by mouth 2 times daily (before meals) 180 tablet 3    amitriptyline (ELAVIL) 10 MG tablet Take 1 tablet by mouth nightly 90 tablet 1    citalopram (CELEXA) 20 MG tablet Take 1 tablet by mouth daily 90 tablet 1    propranolol (INDERAL) 20 MG tablet Take 1 tablet by mouth 2 times daily 180 tablet 1    triamterene-hydrochlorothiazide (MAXZIDE-25) 37.5-25 MG per tablet Take 1 tablet by mouth daily 90 tablet 1    pantoprazole (PROTONIX) 40 MG tablet Take 1 tablet by mouth daily 90 tablet 1    exenatide (BYETTA 5 MCG PEN) 5 MCG/0.02ML injection Inject 0.02 mLs into the skin 2 times daily (with meals) 3 Pen 1    Insulin Pen Needle (B-D ULTRAFINE III SHORT PEN) 31G X 8 MM MISC 1 each by Does not apply route daily 100 each 3    B-D ULTRA FINE LANCETS MISC Test daily 100 each 1    Glucose Blood (BLOOD GLUCOSE TEST STRIPS) STRP Test bid and prn 100 strip 3    aspirin 81 MG tablet Take 81 mg by mouth daily      Melatonin 1 MG CAPS Take by mouth       No current facility-administered medications on file prior to visit. Allergies:  Sesame seed (diagnostic) and Bactrim [sulfamethoxazole-trimethoprim]    Review of Systems:    Review of Systems   Constitutional: Negative. HENT: Negative. Eyes: Negative. Respiratory: Negative. Cardiovascular: Negative. Gastrointestinal: Negative. Endocrine: Negative. Genitourinary: Negative. Musculoskeletal: Negative. Skin: Positive for rash. Allergic/Immunologic: Positive for food allergies. Neurological: Negative. Hematological: Negative. Psychiatric/Behavioral: Negative. Physical Exam:    Physical Exam   Constitutional: She is oriented to person, place, and time. She appears well-developed and well-nourished. HENT:   Head: Normocephalic and atraumatic.    Right Ear: External ear normal.   Left Ear: External ear normal.   Eyes: Conjunctivae and EOM are normal. Pupils are equal, round, and reactive to light. Neck: Normal range of motion. Neck supple. Musculoskeletal: Normal range of motion. Neurological: She is alert and oriented to person, place, and time. She has normal reflexes. Skin: Skin is warm and dry. Psychiatric: She has a normal mood and affect. Her behavior is normal. Judgment and thought content normal.       Assessment and plan: Patient will be scheduled for a screening colonoscopy. Risks benefits alternatives nonoperative management options were discussed with the patient.   All questions were answered patient verbalized understanding

## 2017-11-21 ENCOUNTER — ANESTHESIA EVENT (OUTPATIENT)
Dept: ENDOSCOPY | Age: 52
End: 2017-11-21
Payer: COMMERCIAL

## 2017-11-21 ENCOUNTER — ANESTHESIA (OUTPATIENT)
Dept: ENDOSCOPY | Age: 52
End: 2017-11-21
Payer: COMMERCIAL

## 2017-11-21 ENCOUNTER — HOSPITAL ENCOUNTER (OUTPATIENT)
Age: 52
Setting detail: OUTPATIENT SURGERY
Discharge: HOME | End: 2017-11-21
Attending: COLON & RECTAL SURGERY | Admitting: COLON & RECTAL SURGERY
Payer: COMMERCIAL

## 2017-11-21 VITALS
RESPIRATION RATE: 16 BRPM | DIASTOLIC BLOOD PRESSURE: 78 MMHG | OXYGEN SATURATION: 99 % | HEART RATE: 72 BPM | SYSTOLIC BLOOD PRESSURE: 134 MMHG

## 2017-11-21 VITALS
SYSTOLIC BLOOD PRESSURE: 126 MMHG | RESPIRATION RATE: 17 BRPM | DIASTOLIC BLOOD PRESSURE: 59 MMHG | OXYGEN SATURATION: 99 %

## 2017-11-21 PROCEDURE — 2580000003 HC RX 258: Performed by: COLON & RECTAL SURGERY

## 2017-11-21 PROCEDURE — 88305 TISSUE EXAM BY PATHOLOGIST: CPT

## 2017-11-21 PROCEDURE — 3700000000 HC ANESTHESIA ATTENDED CARE: Performed by: COLON & RECTAL SURGERY

## 2017-11-21 PROCEDURE — 2580000003 HC RX 258: Performed by: NURSE ANESTHETIST, CERTIFIED REGISTERED

## 2017-11-21 PROCEDURE — 7100000010 HC PHASE II RECOVERY - FIRST 15 MIN: Performed by: COLON & RECTAL SURGERY

## 2017-11-21 PROCEDURE — 3609027000 HC COLONOSCOPY: Performed by: COLON & RECTAL SURGERY

## 2017-11-21 PROCEDURE — 7100000011 HC PHASE II RECOVERY - ADDTL 15 MIN: Performed by: COLON & RECTAL SURGERY

## 2017-11-21 PROCEDURE — 3700000001 HC ADD 15 MINUTES (ANESTHESIA): Performed by: COLON & RECTAL SURGERY

## 2017-11-21 PROCEDURE — 6360000002 HC RX W HCPCS: Performed by: NURSE ANESTHETIST, CERTIFIED REGISTERED

## 2017-11-21 RX ORDER — SODIUM CHLORIDE 0.9 % (FLUSH) 0.9 %
10 SYRINGE (ML) INJECTION PRN
Status: DISCONTINUED | OUTPATIENT
Start: 2017-11-21 | End: 2017-11-21 | Stop reason: HOSPADM

## 2017-11-21 RX ORDER — SODIUM CHLORIDE 0.9 % (FLUSH) 0.9 %
10 SYRINGE (ML) INJECTION EVERY 12 HOURS SCHEDULED
Status: DISCONTINUED | OUTPATIENT
Start: 2017-11-21 | End: 2017-11-21 | Stop reason: HOSPADM

## 2017-11-21 RX ORDER — LIDOCAINE HYDROCHLORIDE 10 MG/ML
1 INJECTION, SOLUTION EPIDURAL; INFILTRATION; INTRACAUDAL; PERINEURAL
Status: DISCONTINUED | OUTPATIENT
Start: 2017-11-21 | End: 2017-11-21 | Stop reason: HOSPADM

## 2017-11-21 RX ORDER — SODIUM CHLORIDE 9 MG/ML
INJECTION, SOLUTION INTRAVENOUS CONTINUOUS PRN
Status: DISCONTINUED | OUTPATIENT
Start: 2017-11-21 | End: 2017-11-21 | Stop reason: SDUPTHER

## 2017-11-21 RX ORDER — ONDANSETRON 2 MG/ML
4 INJECTION INTRAMUSCULAR; INTRAVENOUS
Status: DISCONTINUED | OUTPATIENT
Start: 2017-11-21 | End: 2017-11-21 | Stop reason: HOSPADM

## 2017-11-21 RX ORDER — PROPOFOL 10 MG/ML
INJECTION, EMULSION INTRAVENOUS CONTINUOUS PRN
Status: DISCONTINUED | OUTPATIENT
Start: 2017-11-21 | End: 2017-11-21 | Stop reason: SDUPTHER

## 2017-11-21 RX ORDER — SODIUM CHLORIDE 9 MG/ML
INJECTION, SOLUTION INTRAVENOUS CONTINUOUS
Status: DISCONTINUED | OUTPATIENT
Start: 2017-11-21 | End: 2017-11-21 | Stop reason: HOSPADM

## 2017-11-21 RX ADMIN — SODIUM CHLORIDE: 900 INJECTION, SOLUTION INTRAVENOUS at 10:18

## 2017-11-21 RX ADMIN — SODIUM CHLORIDE: 9 INJECTION, SOLUTION INTRAVENOUS at 09:23

## 2017-11-21 RX ADMIN — PROPOFOL 100 MCG/KG/MIN: 10 INJECTION, EMULSION INTRAVENOUS at 10:18

## 2017-11-21 NOTE — ANESTHESIA PRE PROCEDURE
medications for this encounter. Allergies: Allergies   Allergen Reactions    Sesame Seed (Diagnostic)     Bactrim [Sulfamethoxazole-Trimethoprim] Rash       Problem List:    Patient Active Problem List   Diagnosis Code    Depression F32.9    HTN (hypertension) I10    Family history of early CAD Z80.55    Type 2 diabetes mellitus without complication, without long-term current use of insulin ([facility] [address] E11.9       Past Medical History:        Diagnosis Date    Depression     DM2 (diabetes mellitus, type 2) ([facility] [address] [facility]     HTN (hypertension)        Past Surgical History:        Procedure Laterality Date     SECTION  1982/3/96    x3    TONSILLECTOMY  age 8       Social History:    Social History   Substance Use Topics    Smoking status: Never Smoker    Smokeless tobacco: Never Used    Alcohol use 0.0 oz/week      Comment: occ                                Counseling given: Not Answered      Vital Signs (Current): There were no vitals filed for this visit.                                            BP Readings from Last 3 Encounters:   17 124/74   11/10/17 128/74   17 122/62       NPO Status:                                                                                 BMI:   Wt Readings from Last 3 Encounters:   17 221 lb (100.2 kg)   11/10/17 222 lb (100.7 kg)   17 222 lb 2 oz (100.8 kg)     There is no height or weight on file to calculate BMI.    CBC:   Lab Results   Component Value Date    WBC 10.7 2016    RBC 5.04 2016    HGB 14.7 2016    HCT 41.8 2016    MCV 83.0 2016    RDW 12.8 2016     2016       CMP:   Lab Results   Component Value Date     10/20/2017    K 3.8 10/20/2017    CL 98 10/20/2017    CO2 29 10/20/2017    BUN 19 10/20/2017    CREATININE 0.70 10/20/2017    GFRAA >60.0 10/20/2017    LABGLOM >60.0 10/20/2017    GLUCOSE 123 10/20/2017    PROT 6.7 10/20/2017    CALCIUM 9.6 10/20/2017    BILITOT 0.4 10/20/2017    ALKPHOS 74 10/20/2017    AST 22 10/20/2017    ALT 27 10/20/2017       POC Tests: No results for input(s): POCGLU, POCNA, POCK, POCCL, POCBUN, POCHEMO, POCHCT in the last 72 hours. Coags: No results found for: PROTIME, INR, APTT    HCG (If Applicable):   Lab Results   Component Value Date    PREGTESTUR negative 12/15/2015        ABGs: No results found for: PHART, PO2ART, KTI5RIJ, PZV4VMA, BEART, F0XTJOIY     Type & Screen (If Applicable):  No results found for: [facility]    Anesthesia Evaluation  Patient summary reviewed and Nursing notes reviewed  Airway: Mallampati: II  TM distance: <3 FB   Neck ROM: full  Mouth opening: < 3 FB Dental:          Pulmonary:                              Cardiovascular:    (+) hypertension:,                   Neuro/Psych:   (+) psychiatric history:            GI/Hepatic/Renal:   (+) morbid obesity          Endo/Other:    (+) Type II DM, , .                 Abdominal:           Vascular:                                        Anesthesia Plan      MAC     ASA 3       Induction: intravenous. Anesthetic plan and risks discussed with patient.                       Haresh Ortiz CRNA   11/21/2017

## 2017-12-06 ENCOUNTER — TELEPHONE (OUTPATIENT)
Dept: GASTROENTEROLOGY | Age: 52
End: 2017-12-06

## 2018-02-22 DIAGNOSIS — I10 ESSENTIAL HYPERTENSION: Primary | ICD-10-CM

## 2018-02-22 DIAGNOSIS — E11.9 TYPE 2 DIABETES MELLITUS WITHOUT COMPLICATION, WITHOUT LONG-TERM CURRENT USE OF INSULIN (HCC): ICD-10-CM

## 2018-02-23 DIAGNOSIS — E11.9 TYPE 2 DIABETES MELLITUS WITHOUT COMPLICATION, WITHOUT LONG-TERM CURRENT USE OF INSULIN (HCC): ICD-10-CM

## 2018-02-23 DIAGNOSIS — I10 ESSENTIAL HYPERTENSION: ICD-10-CM

## 2018-02-23 LAB
ALBUMIN SERPL-MCNC: 4.1 G/DL (ref 3.9–4.9)
ALP BLD-CCNC: 93 U/L (ref 40–130)
ALT SERPL-CCNC: 36 U/L (ref 0–33)
ANION GAP SERPL CALCULATED.3IONS-SCNC: 14 MEQ/L (ref 7–13)
AST SERPL-CCNC: 26 U/L (ref 0–35)
BILIRUB SERPL-MCNC: 0.3 MG/DL (ref 0–1.2)
BUN BLDV-MCNC: 18 MG/DL (ref 6–20)
CALCIUM SERPL-MCNC: 9.5 MG/DL (ref 8.6–10.2)
CHLORIDE BLD-SCNC: 97 MEQ/L (ref 98–107)
CO2: 28 MEQ/L (ref 22–29)
CREAT SERPL-MCNC: 0.84 MG/DL (ref 0.5–0.9)
CREATININE URINE: 57.7 MG/DL
GFR AFRICAN AMERICAN: >60
GFR NON-AFRICAN AMERICAN: >60
GLOBULIN: 2.3 G/DL (ref 2.3–3.5)
GLUCOSE BLD-MCNC: 168 MG/DL (ref 74–109)
HBA1C MFR BLD: 7.4 % (ref 4.8–5.9)
MICROALBUMIN UR-MCNC: <1.2 MG/DL
MICROALBUMIN/CREAT UR-RTO: NORMAL MG/G (ref 0–30)
POTASSIUM SERPL-SCNC: 4.2 MEQ/L (ref 3.5–5.1)
SODIUM BLD-SCNC: 139 MEQ/L (ref 132–144)
TOTAL PROTEIN: 6.4 G/DL (ref 6.4–8.1)

## 2018-02-23 RX ORDER — EXENATIDE 250 UG/ML
INJECTION SUBCUTANEOUS
Qty: 3.6 ML | Refills: 1 | Status: SHIPPED | OUTPATIENT
Start: 2018-02-23 | End: 2018-08-21 | Stop reason: SDUPTHER

## 2018-03-02 ENCOUNTER — OFFICE VISIT (OUTPATIENT)
Dept: FAMILY MEDICINE CLINIC | Age: 53
End: 2018-03-02
Payer: COMMERCIAL

## 2018-03-02 VITALS
SYSTOLIC BLOOD PRESSURE: 110 MMHG | HEIGHT: 62 IN | DIASTOLIC BLOOD PRESSURE: 62 MMHG | HEART RATE: 76 BPM | RESPIRATION RATE: 16 BRPM | TEMPERATURE: 98 F

## 2018-03-02 DIAGNOSIS — I10 ESSENTIAL HYPERTENSION: ICD-10-CM

## 2018-03-02 DIAGNOSIS — F32.A DEPRESSION, UNSPECIFIED DEPRESSION TYPE: ICD-10-CM

## 2018-03-02 DIAGNOSIS — E11.9 TYPE 2 DIABETES MELLITUS WITHOUT COMPLICATION, WITHOUT LONG-TERM CURRENT USE OF INSULIN (HCC): Primary | ICD-10-CM

## 2018-03-02 PROCEDURE — G8427 DOCREV CUR MEDS BY ELIG CLIN: HCPCS | Performed by: FAMILY MEDICINE

## 2018-03-02 PROCEDURE — 99214 OFFICE O/P EST MOD 30 MIN: CPT | Performed by: FAMILY MEDICINE

## 2018-03-02 PROCEDURE — G8417 CALC BMI ABV UP PARAM F/U: HCPCS | Performed by: FAMILY MEDICINE

## 2018-03-02 PROCEDURE — 3017F COLORECTAL CA SCREEN DOC REV: CPT | Performed by: FAMILY MEDICINE

## 2018-03-02 PROCEDURE — G8484 FLU IMMUNIZE NO ADMIN: HCPCS | Performed by: FAMILY MEDICINE

## 2018-03-02 PROCEDURE — 3014F SCREEN MAMMO DOC REV: CPT | Performed by: FAMILY MEDICINE

## 2018-03-02 PROCEDURE — 3045F PR MOST RECENT HEMOGLOBIN A1C LEVEL 7.0-9.0%: CPT | Performed by: FAMILY MEDICINE

## 2018-03-02 PROCEDURE — 1036F TOBACCO NON-USER: CPT | Performed by: FAMILY MEDICINE

## 2018-03-02 ASSESSMENT — PATIENT HEALTH QUESTIONNAIRE - PHQ9
SUM OF ALL RESPONSES TO PHQ9 QUESTIONS 1 & 2: 0
2. FEELING DOWN, DEPRESSED OR HOPELESS: 0
SUM OF ALL RESPONSES TO PHQ QUESTIONS 1-9: 0
1. LITTLE INTEREST OR PLEASURE IN DOING THINGS: 0

## 2018-03-02 NOTE — PROGRESS NOTES
EGFR, calc. for ages 25 and older using the  MDRD formula (not corrected for weight), is valid for stable  renal function.  GFR  02/23/2018 >60.0  >60 Final    Comment: >60 mL/min/1.73m2 EGFR, calc. for ages 25 and older using the  MDRD formula (not corrected for weight), is valid for stable  renal function.  Calcium 02/23/2018 9.5  8.6 - 10.2 mg/dL Final    Total Protein 02/23/2018 6.4  6.4 - 8.1 g/dL Final    Alb 02/23/2018 4.1  3.9 - 4.9 g/dL Final    Total Bilirubin 02/23/2018 0.3  0.0 - 1.2 mg/dL Final    Alkaline Phosphatase 02/23/2018 93  40 - 130 U/L Final    ALT 02/23/2018 36* 0 - 33 U/L Final    AST 02/23/2018 26  0 - 35 U/L Final    Globulin 02/23/2018 2.3  2.3 - 3.5 g/dL Final    Microalbumin, Random Urine 02/23/2018 <1.20  Not Established mg/dL Final    Creatinine, Ur 02/23/2018 57.7  Not Established mg/dL Final    Microalbumin Creatinine Ratio 02/23/2018 see below  0.0 - 30.0 mg/G Final    Comment: - UR Microalbumin concentration is less than 1.2 mg/dL. - Unable to calculate Microalbumin/Creatinine Ratio without    a Microalbumin concentration.        Health Maintenance   Topic Date Due    Hepatitis C screen  1965    Diabetic retinal exam  04/06/1973    HIV screen  04/06/1978    DTaP/Tdap/Td vaccine (1 - Tdap) 09/02/2018 (Originally 4/6/1982)    Pneumococcal med risk (1 of 1 - PPSV23) 09/02/2018 (Originally 4/6/1982)    Shingles Vaccine (1 of 2 - 2 Dose Series) 09/02/2018 (Originally 4/6/2013)    Flu vaccine (1) 10/23/2018 (Originally 9/1/2017)    Lipid screen  06/05/2018    Diabetic foot exam  06/19/2018    Cervical cancer screen  12/15/2018    A1C test (Diabetic or Prediabetic)  02/23/2019    Diabetic microalbuminuria test  02/23/2019    Potassium monitoring  02/23/2019    Creatinine monitoring  02/23/2019    Breast cancer screen  03/21/2019    Colon cancer screen colonoscopy  11/21/2022         Vitals:    03/02/18 1547   BP: 110/62   Pulse:

## 2018-03-07 RX ORDER — PROPRANOLOL HYDROCHLORIDE 20 MG/1
TABLET ORAL
Qty: 180 TABLET | Refills: 1 | Status: SHIPPED | OUTPATIENT
Start: 2018-03-07 | End: 2018-09-03 | Stop reason: SDUPTHER

## 2018-03-15 RX ORDER — CITALOPRAM 20 MG/1
TABLET ORAL
Qty: 90 TABLET | Refills: 1 | Status: SHIPPED | OUTPATIENT
Start: 2018-03-15 | End: 2018-09-11 | Stop reason: SDUPTHER

## 2018-03-15 RX ORDER — TRIAMTERENE AND HYDROCHLOROTHIAZIDE 37.5; 25 MG/1; MG/1
TABLET ORAL
Qty: 90 TABLET | Refills: 1 | Status: SHIPPED | OUTPATIENT
Start: 2018-03-15 | End: 2018-09-11 | Stop reason: SDUPTHER

## 2018-03-15 RX ORDER — AMITRIPTYLINE HYDROCHLORIDE 10 MG/1
TABLET, FILM COATED ORAL
Qty: 90 TABLET | Refills: 1 | Status: SHIPPED | OUTPATIENT
Start: 2018-03-15 | End: 2018-09-11 | Stop reason: SDUPTHER

## 2018-03-15 RX ORDER — PANTOPRAZOLE SODIUM 40 MG/1
TABLET, DELAYED RELEASE ORAL
Qty: 90 TABLET | Refills: 1 | Status: SHIPPED | OUTPATIENT
Start: 2018-03-15 | End: 2018-09-11 | Stop reason: SDUPTHER

## 2018-04-25 DIAGNOSIS — E11.9 TYPE 2 DIABETES MELLITUS WITHOUT COMPLICATION, WITHOUT LONG-TERM CURRENT USE OF INSULIN (HCC): ICD-10-CM

## 2018-04-25 RX ORDER — GLIPIZIDE 5 MG/1
5 TABLET ORAL
Qty: 180 TABLET | Refills: 1 | Status: SHIPPED | OUTPATIENT
Start: 2018-04-25 | End: 2018-07-09 | Stop reason: SDUPTHER

## 2018-06-29 DIAGNOSIS — E11.9 TYPE 2 DIABETES MELLITUS WITHOUT COMPLICATION, WITHOUT LONG-TERM CURRENT USE OF INSULIN (HCC): Primary | ICD-10-CM

## 2018-06-29 DIAGNOSIS — I10 ESSENTIAL HYPERTENSION: ICD-10-CM

## 2018-07-02 DIAGNOSIS — E11.9 TYPE 2 DIABETES MELLITUS WITHOUT COMPLICATION, WITHOUT LONG-TERM CURRENT USE OF INSULIN (HCC): ICD-10-CM

## 2018-07-02 DIAGNOSIS — I10 ESSENTIAL HYPERTENSION: ICD-10-CM

## 2018-07-02 LAB
ALBUMIN SERPL-MCNC: 4.2 G/DL (ref 3.9–4.9)
ALP BLD-CCNC: 95 U/L (ref 40–130)
ALT SERPL-CCNC: 34 U/L (ref 0–33)
ANION GAP SERPL CALCULATED.3IONS-SCNC: 16 MEQ/L (ref 7–13)
AST SERPL-CCNC: 27 U/L (ref 0–35)
BILIRUB SERPL-MCNC: 0.4 MG/DL (ref 0–1.2)
BUN BLDV-MCNC: 17 MG/DL (ref 6–20)
CALCIUM SERPL-MCNC: 9.6 MG/DL (ref 8.6–10.2)
CHLORIDE BLD-SCNC: 95 MEQ/L (ref 98–107)
CO2: 27 MEQ/L (ref 22–29)
CREAT SERPL-MCNC: 0.87 MG/DL (ref 0.5–0.9)
GFR AFRICAN AMERICAN: >60
GFR NON-AFRICAN AMERICAN: >60
GLOBULIN: 2.7 G/DL (ref 2.3–3.5)
GLUCOSE BLD-MCNC: 194 MG/DL (ref 74–109)
HBA1C MFR BLD: 8.1 % (ref 4.8–5.9)
POTASSIUM SERPL-SCNC: 3.5 MEQ/L (ref 3.5–5.1)
SODIUM BLD-SCNC: 138 MEQ/L (ref 132–144)
TOTAL PROTEIN: 6.9 G/DL (ref 6.4–8.1)

## 2018-07-09 ENCOUNTER — OFFICE VISIT (OUTPATIENT)
Dept: FAMILY MEDICINE CLINIC | Age: 53
End: 2018-07-09
Payer: COMMERCIAL

## 2018-07-09 VITALS
TEMPERATURE: 98 F | SYSTOLIC BLOOD PRESSURE: 128 MMHG | BODY MASS INDEX: 40.48 KG/M2 | HEART RATE: 76 BPM | RESPIRATION RATE: 16 BRPM | WEIGHT: 220 LBS | DIASTOLIC BLOOD PRESSURE: 72 MMHG | HEIGHT: 62 IN

## 2018-07-09 DIAGNOSIS — I10 ESSENTIAL HYPERTENSION: ICD-10-CM

## 2018-07-09 DIAGNOSIS — E11.9 TYPE 2 DIABETES MELLITUS WITHOUT COMPLICATION, WITHOUT LONG-TERM CURRENT USE OF INSULIN (HCC): Primary | ICD-10-CM

## 2018-07-09 DIAGNOSIS — Z12.31 ENCOUNTER FOR SCREENING MAMMOGRAM FOR BREAST CANCER: ICD-10-CM

## 2018-07-09 DIAGNOSIS — F32.A DEPRESSION, UNSPECIFIED DEPRESSION TYPE: ICD-10-CM

## 2018-07-09 DIAGNOSIS — E11.9 TYPE 2 DIABETES MELLITUS WITHOUT COMPLICATION, WITHOUT LONG-TERM CURRENT USE OF INSULIN (HCC): ICD-10-CM

## 2018-07-09 LAB
CHOLESTEROL, TOTAL: 160 MG/DL (ref 0–199)
HDLC SERPL-MCNC: 40 MG/DL (ref 40–59)
LDL CHOLESTEROL CALCULATED: 93 MG/DL (ref 0–129)
TRIGL SERPL-MCNC: 133 MG/DL (ref 0–200)

## 2018-07-09 PROCEDURE — 3045F PR MOST RECENT HEMOGLOBIN A1C LEVEL 7.0-9.0%: CPT | Performed by: FAMILY MEDICINE

## 2018-07-09 PROCEDURE — 99214 OFFICE O/P EST MOD 30 MIN: CPT | Performed by: FAMILY MEDICINE

## 2018-07-09 PROCEDURE — G8427 DOCREV CUR MEDS BY ELIG CLIN: HCPCS | Performed by: FAMILY MEDICINE

## 2018-07-09 PROCEDURE — 2022F DILAT RTA XM EVC RTNOPTHY: CPT | Performed by: FAMILY MEDICINE

## 2018-07-09 PROCEDURE — 1036F TOBACCO NON-USER: CPT | Performed by: FAMILY MEDICINE

## 2018-07-09 PROCEDURE — G8417 CALC BMI ABV UP PARAM F/U: HCPCS | Performed by: FAMILY MEDICINE

## 2018-07-09 PROCEDURE — 3017F COLORECTAL CA SCREEN DOC REV: CPT | Performed by: FAMILY MEDICINE

## 2018-07-09 RX ORDER — GLIPIZIDE 5 MG/1
5 TABLET ORAL 3 TIMES DAILY
Qty: 270 TABLET | Refills: 1 | Status: SHIPPED | OUTPATIENT
Start: 2018-07-09 | End: 2018-11-07 | Stop reason: DRUGHIGH

## 2018-07-09 NOTE — PROGRESS NOTES
exams,and f/u regularly for hba1c with a goal of 6.5.     NO evidence of neuropathy or nephropathy at this point    Follow up as planned for hba1c and BP checks    Sooner if condition deteriorates or problems arise    Dora Mccauley MD

## 2018-07-13 ENCOUNTER — HOSPITAL ENCOUNTER (OUTPATIENT)
Dept: WOMENS IMAGING | Age: 53
Discharge: HOME | End: 2018-07-15
Payer: COMMERCIAL

## 2018-07-13 DIAGNOSIS — Z12.31 ENCOUNTER FOR SCREENING MAMMOGRAM FOR BREAST CANCER: ICD-10-CM

## 2018-07-13 PROCEDURE — 77067 SCR MAMMO BI INCL CAD: CPT

## 2018-08-21 RX ORDER — EXENATIDE 250 UG/ML
INJECTION SUBCUTANEOUS
Qty: 3.6 ML | Refills: 1 | Status: SHIPPED | OUTPATIENT
Start: 2018-08-21 | End: 2018-11-07 | Stop reason: SDUPTHER

## 2018-09-04 RX ORDER — PROPRANOLOL HYDROCHLORIDE 20 MG/1
TABLET ORAL
Qty: 180 TABLET | Refills: 1 | Status: SHIPPED | OUTPATIENT
Start: 2018-09-04 | End: 2019-03-03 | Stop reason: SDUPTHER

## 2018-09-11 RX ORDER — CITALOPRAM 20 MG/1
TABLET ORAL
Qty: 90 TABLET | Refills: 1 | Status: SHIPPED | OUTPATIENT
Start: 2018-09-11 | End: 2019-03-10 | Stop reason: SDUPTHER

## 2018-09-11 RX ORDER — TRIAMTERENE AND HYDROCHLOROTHIAZIDE 37.5; 25 MG/1; MG/1
TABLET ORAL
Qty: 90 TABLET | Refills: 1 | Status: SHIPPED | OUTPATIENT
Start: 2018-09-11 | End: 2019-03-10 | Stop reason: SDUPTHER

## 2018-09-11 RX ORDER — PANTOPRAZOLE SODIUM 40 MG/1
TABLET, DELAYED RELEASE ORAL
Qty: 90 TABLET | Refills: 1 | Status: SHIPPED | OUTPATIENT
Start: 2018-09-11 | End: 2019-03-10 | Stop reason: SDUPTHER

## 2018-09-11 RX ORDER — AMITRIPTYLINE HYDROCHLORIDE 10 MG/1
TABLET, FILM COATED ORAL
Qty: 90 TABLET | Refills: 1 | Status: SHIPPED | OUTPATIENT
Start: 2018-09-11 | End: 2019-03-10 | Stop reason: SDUPTHER

## 2018-11-01 DIAGNOSIS — I10 ESSENTIAL HYPERTENSION: ICD-10-CM

## 2018-11-01 DIAGNOSIS — E11.9 TYPE 2 DIABETES MELLITUS WITHOUT COMPLICATION, WITHOUT LONG-TERM CURRENT USE OF INSULIN (HCC): Primary | ICD-10-CM

## 2018-11-01 DIAGNOSIS — E11.9 TYPE 2 DIABETES MELLITUS WITHOUT COMPLICATION, WITHOUT LONG-TERM CURRENT USE OF INSULIN (HCC): ICD-10-CM

## 2018-11-01 LAB
ALBUMIN SERPL-MCNC: 4.2 G/DL (ref 3.9–4.9)
ALP BLD-CCNC: 85 U/L (ref 40–130)
ALT SERPL-CCNC: 35 U/L (ref 0–33)
ANION GAP SERPL CALCULATED.3IONS-SCNC: 14 MEQ/L (ref 7–13)
AST SERPL-CCNC: 28 U/L (ref 0–35)
BILIRUB SERPL-MCNC: 0.4 MG/DL (ref 0–1.2)
BUN BLDV-MCNC: 16 MG/DL (ref 6–20)
CALCIUM SERPL-MCNC: 9.3 MG/DL (ref 8.6–10.2)
CHLORIDE BLD-SCNC: 99 MEQ/L (ref 98–107)
CO2: 28 MEQ/L (ref 22–29)
CREAT SERPL-MCNC: 0.9 MG/DL (ref 0.5–0.9)
GFR AFRICAN AMERICAN: >60
GFR NON-AFRICAN AMERICAN: >60
GLOBULIN: 2.7 G/DL (ref 2.3–3.5)
GLUCOSE BLD-MCNC: 188 MG/DL (ref 74–109)
HBA1C MFR BLD: 8.4 % (ref 4.8–5.9)
POTASSIUM SERPL-SCNC: 4 MEQ/L (ref 3.5–5.1)
SODIUM BLD-SCNC: 141 MEQ/L (ref 132–144)
TOTAL PROTEIN: 6.9 G/DL (ref 6.4–8.1)

## 2018-11-07 ENCOUNTER — OFFICE VISIT (OUTPATIENT)
Dept: FAMILY MEDICINE CLINIC | Age: 53
End: 2018-11-07
Payer: COMMERCIAL

## 2018-11-07 VITALS
DIASTOLIC BLOOD PRESSURE: 64 MMHG | SYSTOLIC BLOOD PRESSURE: 118 MMHG | BODY MASS INDEX: 41.04 KG/M2 | WEIGHT: 223 LBS | HEART RATE: 76 BPM | HEIGHT: 62 IN | RESPIRATION RATE: 16 BRPM | TEMPERATURE: 98 F

## 2018-11-07 DIAGNOSIS — I10 ESSENTIAL HYPERTENSION: ICD-10-CM

## 2018-11-07 DIAGNOSIS — E11.9 TYPE 2 DIABETES MELLITUS WITHOUT COMPLICATION, WITHOUT LONG-TERM CURRENT USE OF INSULIN (HCC): Primary | ICD-10-CM

## 2018-11-07 DIAGNOSIS — F32.A DEPRESSION, UNSPECIFIED DEPRESSION TYPE: ICD-10-CM

## 2018-11-07 PROCEDURE — 1036F TOBACCO NON-USER: CPT | Performed by: FAMILY MEDICINE

## 2018-11-07 PROCEDURE — G8417 CALC BMI ABV UP PARAM F/U: HCPCS | Performed by: FAMILY MEDICINE

## 2018-11-07 PROCEDURE — 3045F PR MOST RECENT HEMOGLOBIN A1C LEVEL 7.0-9.0%: CPT | Performed by: FAMILY MEDICINE

## 2018-11-07 PROCEDURE — G8484 FLU IMMUNIZE NO ADMIN: HCPCS | Performed by: FAMILY MEDICINE

## 2018-11-07 PROCEDURE — 3017F COLORECTAL CA SCREEN DOC REV: CPT | Performed by: FAMILY MEDICINE

## 2018-11-07 PROCEDURE — G8427 DOCREV CUR MEDS BY ELIG CLIN: HCPCS | Performed by: FAMILY MEDICINE

## 2018-11-07 PROCEDURE — 2022F DILAT RTA XM EVC RTNOPTHY: CPT | Performed by: FAMILY MEDICINE

## 2018-11-07 PROCEDURE — 99214 OFFICE O/P EST MOD 30 MIN: CPT | Performed by: FAMILY MEDICINE

## 2018-11-07 RX ORDER — GLIPIZIDE 10 MG/1
10 TABLET ORAL 2 TIMES DAILY
Qty: 180 TABLET | Refills: 3 | Status: SHIPPED | OUTPATIENT
Start: 2018-11-07 | End: 2019-04-01 | Stop reason: ALTCHOICE

## 2018-11-07 NOTE — PROGRESS NOTES
ages 25 and older using the  MDRD formula (not corrected for weight), is valid for stable  renal function.  GFR  11/01/2018 >60.0  >60 Final    Comment: >60 mL/min/1.73m2 EGFR, calc. for ages 25 and older using the  MDRD formula (not corrected for weight), is valid for stable  renal function.       Calcium 11/01/2018 9.3  8.6 - 10.2 mg/dL Final    Total Protein 11/01/2018 6.9  6.4 - 8.1 g/dL Final    Alb 11/01/2018 4.2  3.9 - 4.9 g/dL Final    Total Bilirubin 11/01/2018 0.4  0.0 - 1.2 mg/dL Final    Alkaline Phosphatase 11/01/2018 85  40 - 130 U/L Final    ALT 11/01/2018 35* 0 - 33 U/L Final    AST 11/01/2018 28  0 - 35 U/L Final    Globulin 11/01/2018 2.7  2.3 - 3.5 g/dL Final     Health Maintenance   Topic Date Due    Hepatitis C screen  1965    Diabetic retinal exam  04/06/1973    HIV screen  04/06/1978    DTaP/Tdap/Td vaccine (1 - Tdap) 04/06/1982    Shingles Vaccine (1 of 2 - 2 Dose Series) 04/06/2013    Flu vaccine (1) 11/07/2019 (Originally 9/1/2018)    Pneumococcal med risk (1 of 1 - PPSV23) 11/07/2019 (Originally 4/6/1982)    Cervical cancer screen  12/15/2018    Diabetic microalbuminuria test  02/23/2019    Diabetic foot exam  07/09/2019    Lipid screen  07/09/2019    A1C test (Diabetic or Prediabetic)  11/01/2019    Potassium monitoring  11/01/2019    Creatinine monitoring  11/01/2019    Breast cancer screen  07/13/2020    Colon cancer screen colonoscopy  11/21/2022         Wt Readings from Last 3 Encounters:   11/07/18 223 lb (101.2 kg)   07/09/18 220 lb (99.8 kg)   11/14/17 221 lb (100.2 kg)     Temp Readings from Last 3 Encounters:   11/07/18 98 °F (36.7 °C) (Oral)   07/09/18 98 °F (36.7 °C) (Oral)   03/02/18 98 °F (36.7 °C) (Oral)     BP Readings from Last 3 Encounters:   11/07/18 118/64   07/09/18 128/72   03/02/18 110/62     Pulse Readings from Last 3 Encounters:   11/07/18 76   07/09/18 76   03/02/18 76     PHYSICAL EXAMINATION:

## 2019-03-01 DIAGNOSIS — I10 ESSENTIAL HYPERTENSION: ICD-10-CM

## 2019-03-01 DIAGNOSIS — E11.9 TYPE 2 DIABETES MELLITUS WITHOUT COMPLICATION, WITHOUT LONG-TERM CURRENT USE OF INSULIN (HCC): ICD-10-CM

## 2019-03-01 DIAGNOSIS — E11.9 TYPE 2 DIABETES MELLITUS WITHOUT COMPLICATION, WITHOUT LONG-TERM CURRENT USE OF INSULIN (HCC): Primary | ICD-10-CM

## 2019-03-01 LAB
ALBUMIN SERPL-MCNC: 4.2 G/DL (ref 3.5–4.6)
ALP BLD-CCNC: 88 U/L (ref 40–130)
ALT SERPL-CCNC: 37 U/L (ref 0–33)
ANION GAP SERPL CALCULATED.3IONS-SCNC: 16 MEQ/L (ref 9–15)
AST SERPL-CCNC: 31 U/L (ref 0–35)
BILIRUB SERPL-MCNC: 0.4 MG/DL (ref 0.2–0.7)
BUN BLDV-MCNC: 17 MG/DL (ref 6–20)
CALCIUM SERPL-MCNC: 8.4 MG/DL (ref 8.5–9.9)
CHLORIDE BLD-SCNC: 98 MEQ/L (ref 95–107)
CHOLESTEROL, TOTAL: 164 MG/DL (ref 0–199)
CO2: 27 MEQ/L (ref 20–31)
CREAT SERPL-MCNC: 0.87 MG/DL (ref 0.5–0.9)
GFR AFRICAN AMERICAN: >60
GFR NON-AFRICAN AMERICAN: >60
GLOBULIN: 2.5 G/DL (ref 2.3–3.5)
GLUCOSE BLD-MCNC: 201 MG/DL (ref 70–99)
HBA1C MFR BLD: 8.2 % (ref 4.8–5.9)
HDLC SERPL-MCNC: 38 MG/DL (ref 40–59)
LDL CHOLESTEROL CALCULATED: 91 MG/DL (ref 0–129)
POTASSIUM SERPL-SCNC: 3.9 MEQ/L (ref 3.4–4.9)
SODIUM BLD-SCNC: 141 MEQ/L (ref 135–144)
TOTAL PROTEIN: 6.7 G/DL (ref 6.3–8)
TRIGL SERPL-MCNC: 175 MG/DL (ref 0–150)

## 2019-03-04 RX ORDER — PROPRANOLOL HYDROCHLORIDE 20 MG/1
TABLET ORAL
Qty: 180 TABLET | Refills: 1 | Status: SHIPPED | OUTPATIENT
Start: 2019-03-04 | End: 2019-05-29 | Stop reason: SDUPTHER

## 2019-03-08 ENCOUNTER — OFFICE VISIT (OUTPATIENT)
Dept: FAMILY MEDICINE CLINIC | Age: 54
End: 2019-03-08
Payer: COMMERCIAL

## 2019-03-08 VITALS
WEIGHT: 223 LBS | HEART RATE: 72 BPM | BODY MASS INDEX: 41.04 KG/M2 | SYSTOLIC BLOOD PRESSURE: 136 MMHG | RESPIRATION RATE: 16 BRPM | DIASTOLIC BLOOD PRESSURE: 64 MMHG | TEMPERATURE: 98.1 F | HEIGHT: 62 IN

## 2019-03-08 DIAGNOSIS — E11.9 TYPE 2 DIABETES MELLITUS WITHOUT COMPLICATION, WITHOUT LONG-TERM CURRENT USE OF INSULIN (HCC): Primary | ICD-10-CM

## 2019-03-08 DIAGNOSIS — F32.A DEPRESSION, UNSPECIFIED DEPRESSION TYPE: ICD-10-CM

## 2019-03-08 DIAGNOSIS — I10 ESSENTIAL HYPERTENSION: ICD-10-CM

## 2019-03-08 PROCEDURE — G8484 FLU IMMUNIZE NO ADMIN: HCPCS | Performed by: FAMILY MEDICINE

## 2019-03-08 PROCEDURE — G8427 DOCREV CUR MEDS BY ELIG CLIN: HCPCS | Performed by: FAMILY MEDICINE

## 2019-03-08 PROCEDURE — 3045F PR MOST RECENT HEMOGLOBIN A1C LEVEL 7.0-9.0%: CPT | Performed by: FAMILY MEDICINE

## 2019-03-08 PROCEDURE — 3017F COLORECTAL CA SCREEN DOC REV: CPT | Performed by: FAMILY MEDICINE

## 2019-03-08 PROCEDURE — 2022F DILAT RTA XM EVC RTNOPTHY: CPT | Performed by: FAMILY MEDICINE

## 2019-03-08 PROCEDURE — G8417 CALC BMI ABV UP PARAM F/U: HCPCS | Performed by: FAMILY MEDICINE

## 2019-03-08 PROCEDURE — 99214 OFFICE O/P EST MOD 30 MIN: CPT | Performed by: FAMILY MEDICINE

## 2019-03-08 PROCEDURE — 1036F TOBACCO NON-USER: CPT | Performed by: FAMILY MEDICINE

## 2019-03-08 RX ORDER — FLASH GLUCOSE SENSOR
KIT MISCELLANEOUS
Qty: 1 EACH | Refills: 0 | Status: SHIPPED | OUTPATIENT
Start: 2019-03-08 | End: 2019-03-20 | Stop reason: SDUPTHER

## 2019-03-08 ASSESSMENT — PATIENT HEALTH QUESTIONNAIRE - PHQ9
2. FEELING DOWN, DEPRESSED OR HOPELESS: 0
SUM OF ALL RESPONSES TO PHQ9 QUESTIONS 1 & 2: 0
1. LITTLE INTEREST OR PLEASURE IN DOING THINGS: 0
SUM OF ALL RESPONSES TO PHQ QUESTIONS 1-9: 0
SUM OF ALL RESPONSES TO PHQ QUESTIONS 1-9: 0

## 2019-03-11 RX ORDER — PANTOPRAZOLE SODIUM 40 MG/1
TABLET, DELAYED RELEASE ORAL
Qty: 90 TABLET | Refills: 1 | Status: SHIPPED | OUTPATIENT
Start: 2019-03-11 | End: 2019-05-29 | Stop reason: SDUPTHER

## 2019-03-11 RX ORDER — CITALOPRAM 20 MG/1
TABLET ORAL
Qty: 90 TABLET | Refills: 1 | Status: SHIPPED | OUTPATIENT
Start: 2019-03-11 | End: 2019-05-29 | Stop reason: SDUPTHER

## 2019-03-11 RX ORDER — TRIAMTERENE AND HYDROCHLOROTHIAZIDE 37.5; 25 MG/1; MG/1
TABLET ORAL
Qty: 90 TABLET | Refills: 1 | Status: SHIPPED | OUTPATIENT
Start: 2019-03-11 | End: 2019-05-29 | Stop reason: SDUPTHER

## 2019-03-11 RX ORDER — AMITRIPTYLINE HYDROCHLORIDE 10 MG/1
TABLET, FILM COATED ORAL
Qty: 90 TABLET | Refills: 1 | Status: SHIPPED | OUTPATIENT
Start: 2019-03-11 | End: 2019-05-29 | Stop reason: SDUPTHER

## 2019-03-20 DIAGNOSIS — E11.9 TYPE 2 DIABETES MELLITUS WITHOUT COMPLICATION, WITHOUT LONG-TERM CURRENT USE OF INSULIN (HCC): ICD-10-CM

## 2019-03-21 RX ORDER — FLASH GLUCOSE SENSOR
KIT MISCELLANEOUS
Qty: 1 EACH | Refills: 0 | Status: SHIPPED | OUTPATIENT
Start: 2019-03-21 | End: 2019-05-29 | Stop reason: ALTCHOICE

## 2019-03-29 ENCOUNTER — TELEPHONE (OUTPATIENT)
Dept: FAMILY MEDICINE CLINIC | Age: 54
End: 2019-03-29

## 2019-04-01 ENCOUNTER — OFFICE VISIT (OUTPATIENT)
Dept: ENDOCRINOLOGY | Age: 54
End: 2019-04-01
Payer: COMMERCIAL

## 2019-04-01 VITALS
DIASTOLIC BLOOD PRESSURE: 74 MMHG | WEIGHT: 221 LBS | HEART RATE: 65 BPM | SYSTOLIC BLOOD PRESSURE: 111 MMHG | HEIGHT: 62 IN | BODY MASS INDEX: 40.67 KG/M2

## 2019-04-01 DIAGNOSIS — E11.9 TYPE 2 DIABETES MELLITUS WITHOUT COMPLICATION, WITHOUT LONG-TERM CURRENT USE OF INSULIN (HCC): Primary | ICD-10-CM

## 2019-04-01 DIAGNOSIS — E66.01 MORBID OBESITY (HCC): ICD-10-CM

## 2019-04-01 DIAGNOSIS — B35.1 ONYCHOMYCOSIS: ICD-10-CM

## 2019-04-01 LAB — GLUCOSE BLD-MCNC: 256 MG/DL

## 2019-04-01 PROCEDURE — 1036F TOBACCO NON-USER: CPT | Performed by: INTERNAL MEDICINE

## 2019-04-01 PROCEDURE — 3045F PR MOST RECENT HEMOGLOBIN A1C LEVEL 7.0-9.0%: CPT | Performed by: INTERNAL MEDICINE

## 2019-04-01 PROCEDURE — 82962 GLUCOSE BLOOD TEST: CPT | Performed by: INTERNAL MEDICINE

## 2019-04-01 PROCEDURE — 2022F DILAT RTA XM EVC RTNOPTHY: CPT | Performed by: INTERNAL MEDICINE

## 2019-04-01 PROCEDURE — 3017F COLORECTAL CA SCREEN DOC REV: CPT | Performed by: INTERNAL MEDICINE

## 2019-04-01 PROCEDURE — 99203 OFFICE O/P NEW LOW 30 MIN: CPT | Performed by: INTERNAL MEDICINE

## 2019-04-01 PROCEDURE — G8417 CALC BMI ABV UP PARAM F/U: HCPCS | Performed by: INTERNAL MEDICINE

## 2019-04-01 PROCEDURE — G8427 DOCREV CUR MEDS BY ELIG CLIN: HCPCS | Performed by: INTERNAL MEDICINE

## 2019-04-01 RX ORDER — FLASH GLUCOSE SENSOR
KIT MISCELLANEOUS
Qty: 2 EACH | Refills: 6 | Status: SHIPPED | OUTPATIENT
Start: 2019-04-01 | End: 2019-05-29 | Stop reason: ALTCHOICE

## 2019-04-01 ASSESSMENT — ENCOUNTER SYMPTOMS: VISUAL CHANGE: 0

## 2019-04-01 NOTE — PROGRESS NOTES
Subjective:      Patient ID: Sharlene Huitron is a 52 y.o. female. Patient self-referred here for type 2 diabetes  Diabetes   She presents for her initial diabetic visit. She has type 2 diabetes mellitus. Pertinent negatives for diabetes include no polydipsia, no polyuria and no visual change. Symptoms are worsening. Risk factors for coronary artery disease include family history. Current diabetic treatments: treshiba plus glipizide  She is currently taking insulin at bedtime. Her overall blood glucose range is >200 mg/dl. (Lab Results       Component                Value               Date                       LABA1C                   8.2 (H)             03/01/2019              Patient using FreeStyle haydee system reviewed glucose readings FreeStyle average glucose between 200-300 range  Higher blood sugars post prandial  )     Patient has significant family history of heart disease    Morbid obesity Body mass index is 40.42 kg/m². Patient Active Problem List   Diagnosis    Depression    Essential hypertension    Family history of early CAD    Type 2 diabetes mellitus without complication, without long-term current use of insulin ([facility] [address]     Social History     Socioeconomic History    Marital status: Single     Spouse name: Not on file    Number of children: Not on file    Years of education: Not on file    Highest education level: Not on file   Occupational History    Not on file   Social Needs    Financial resource strain: Not on file    Food insecurity:     Worry: Not on file     Inability: Not on file    Transportation needs:     Medical: Not on file     Non-medical: Not on file   Tobacco Use    Smoking status: Never Smoker    Smokeless tobacco: Never Used   Substance and Sexual Activity    Alcohol use:  Yes     Alcohol/week: 0.0 oz     Comment: occ    Drug use: No    Sexual activity: Yes   Lifestyle    Physical activity:     Days per week: Not on file     Minutes per session: Not on file  Stress: Not on file   Relationships    Social connections:     Talks on phone: Not on file     Gets together: Not on file     Attends Sikhism service: Not on file     Active member of club or organization: Not on file     Attends meetings of clubs or organizations: Not on file     Relationship status: Not on file    Intimate partner violence:     Fear of current or ex partner: Not on file     Emotionally abused: Not on file     Physically abused: Not on file     Forced sexual activity: Not on file   Other Topics Concern    Not on file   Social History Narrative    Not on file     Past Surgical History:   Procedure Laterality Date    CATARACT REMOVAL Bilateral 2019     SECTION  1982/3/96    x3    AL COLON CA SCRN NOT  [address] IND N/A 2017    COLONOSCOPY performed by Amanda Posadas MD polyp needs '22    TONSILLECTOMY  age 8         Review of Systems   Endocrine: Negative for polydipsia and polyuria. All other systems reviewed and are negative. Vitals:    19 1114   BP: 111/74   Pulse: 65   Weight: 221 lb (100.2 kg)   Height: 5' 2\" (1.575 m)       Objective:   Physical Exam   Constitutional: She is oriented to person, place, and time. She appears well-developed and well-nourished. HENT:   Head: Normocephalic and atraumatic. Right Ear: External ear normal.   Left Ear: External ear normal.   Eyes: Conjunctivae and EOM are normal. Right eye exhibits no discharge. Left eye exhibits no discharge. No scleral icterus. Neck: Neck supple. No thyromegaly present. Cardiovascular: Normal rate, regular rhythm, normal heart sounds and intact distal pulses. Pulmonary/Chest: Breath sounds normal.   Abdominal:   Obese    Musculoskeletal: Normal range of motion. Feet:    Lymphadenopathy:     She has no cervical adenopathy. Neurological: She is alert and oriented to person, place, and time. Skin: Skin is warm. Psychiatric: She has a normal mood and affect. Assessment:       Diagnosis Orders   1. Type 2 diabetes mellitus without complication, without long-term current use of insulin ([facility])  Basic Metabolic Panel    Hemoglobin A1C    Microalbumin / Creatinine Urine Ratio    POCT Glucose   2. Morbid obesity ([address]     3.  Onychomycosis             Plan:      Orders Placed This Encounter   Procedures    Basic Metabolic Panel     Standing Status:   Future     Standing Expiration Date:   4/1/2020    Hemoglobin A1C     Standing Status:   Future     Standing Expiration Date:   4/1/2020    Microalbumin / Creatinine Urine Ratio     Standing Status:   Future     Standing Expiration Date:   4/1/2020    POCT Glucose     Increase treshiba insulin dose   Add humalog 10 units tid   Stop glipizide   A1c goal of 7 or lower  Orders Placed This Encounter   Medications    Insulin Degludec (TRESIBA FLEXTOUCH) 100 UNIT/ML SOPN     Sig: Inject 50 Units into the skin daily     Dispense:  10 pen     Refill:  3    insulin lispro (HUMALOG KWIKPEN) 100 UNIT/ML pen     Sig: 10 units at each meals     Dispense:  10 pen     Refill:  3    Insulin Pen Needle (NOVOFINE) 32G X 6 MM MISC     Sig: qid     Dispense:  300 each     Refill:  3    Continuous Blood Gluc Sensor (FREESTYLE JAMILA 14 DAY SENSOR) MISC     Sig: Every 2 weeks     Dispense:  2 each     Refill:  06     Medications Discontinued During This Encounter   Medication Reason    glipiZIDE (GLUCOTROL) 10 MG tablet Therapy completed    Insulin Degludec (TRESIBA FLEXTOUCH) 100 UNIT/ML SOPN REORDER     More than 50% of 30 minutes spent in patient education counseling patient to follow up in 4 weeks time        Linsey Osman MD

## 2019-04-15 ENCOUNTER — OFFICE VISIT (OUTPATIENT)
Dept: FAMILY MEDICINE CLINIC | Age: 54
End: 2019-04-15
Payer: COMMERCIAL

## 2019-04-15 VITALS
HEART RATE: 69 BPM | BODY MASS INDEX: 40.48 KG/M2 | OXYGEN SATURATION: 99 % | WEIGHT: 220 LBS | RESPIRATION RATE: 16 BRPM | SYSTOLIC BLOOD PRESSURE: 138 MMHG | HEIGHT: 62 IN | TEMPERATURE: 97.3 F | DIASTOLIC BLOOD PRESSURE: 82 MMHG

## 2019-04-15 DIAGNOSIS — J20.9 ACUTE BRONCHITIS, UNSPECIFIED ORGANISM: Primary | ICD-10-CM

## 2019-04-15 PROCEDURE — 99213 OFFICE O/P EST LOW 20 MIN: CPT | Performed by: PHYSICIAN ASSISTANT

## 2019-04-15 PROCEDURE — 3017F COLORECTAL CA SCREEN DOC REV: CPT | Performed by: PHYSICIAN ASSISTANT

## 2019-04-15 PROCEDURE — G8417 CALC BMI ABV UP PARAM F/U: HCPCS | Performed by: PHYSICIAN ASSISTANT

## 2019-04-15 PROCEDURE — G8427 DOCREV CUR MEDS BY ELIG CLIN: HCPCS | Performed by: PHYSICIAN ASSISTANT

## 2019-04-15 PROCEDURE — 1036F TOBACCO NON-USER: CPT | Performed by: PHYSICIAN ASSISTANT

## 2019-04-15 PROCEDURE — 94640 AIRWAY INHALATION TREATMENT: CPT | Performed by: PHYSICIAN ASSISTANT

## 2019-04-15 RX ORDER — ALBUTEROL SULFATE 90 UG/1
1-2 AEROSOL, METERED RESPIRATORY (INHALATION) EVERY 4 HOURS PRN
Qty: 1 INHALER | Refills: 0 | Status: SHIPPED | OUTPATIENT
Start: 2019-04-15

## 2019-04-15 RX ORDER — ALBUTEROL SULFATE 2.5 MG/3ML
2.5 SOLUTION RESPIRATORY (INHALATION) ONCE
Status: COMPLETED | OUTPATIENT
Start: 2019-04-15 | End: 2019-04-15

## 2019-04-15 RX ORDER — DEXTROMETHORPHAN HYDROBROMIDE AND PROMETHAZINE HYDROCHLORIDE 15; 6.25 MG/5ML; MG/5ML
5-10 SYRUP ORAL 4 TIMES DAILY PRN
Qty: 200 ML | Refills: 0 | Status: SHIPPED | OUTPATIENT
Start: 2019-04-15 | End: 2019-12-09

## 2019-04-15 RX ORDER — CEFDINIR 300 MG/1
300 CAPSULE ORAL 2 TIMES DAILY
Qty: 20 CAPSULE | Refills: 0 | Status: SHIPPED | OUTPATIENT
Start: 2019-04-15 | End: 2019-04-25

## 2019-04-15 RX ADMIN — ALBUTEROL SULFATE 2.5 MG: 2.5 SOLUTION RESPIRATORY (INHALATION) at 18:00

## 2019-04-15 ASSESSMENT — ENCOUNTER SYMPTOMS
COUGH: 1
SORE THROAT: 0
HEMOPTYSIS: 0
CHEST TIGHTNESS: 0
CHOKING: 0
RHINORRHEA: 0
SHORTNESS OF BREATH: 1
STRIDOR: 0
WHEEZING: 1

## 2019-04-15 NOTE — PROGRESS NOTES
Subjective     Hattie Resides 62 y.o. female presents 4/15/19 with   Chief Complaint   Patient presents with    URI     Presents today C/O URI SX X1 week. SX include cough, congestion, phlegm and pain right behind ears       Cough   This is a new problem. The current episode started in the past 7 days. The problem has been unchanged. The cough is productive of sputum. Associated symptoms include shortness of breath and wheezing. Pertinent negatives include no chest pain, chills, ear congestion, ear pain, fever, headaches, hemoptysis, myalgias, nasal congestion, postnasal drip, rash, rhinorrhea, sore throat, sweats or weight loss. Nothing aggravates the symptoms. Treatments tried: benadryl, tussin-dm, cough drops. The treatment provided no relief. There is no history of asthma, bronchiectasis, bronchitis, COPD, emphysema, environmental allergies or pneumonia.        Reviewed thefollowing history:    Past Medical History:   Diagnosis Date    Depression     DM2 (diabetes mellitus, type 2) ([facility] [address]     HTN (hypertension)      Past Surgical History:   Procedure Laterality Date    CATARACT REMOVAL Bilateral 2019     SECTION  1982/3/96    x3    MD COLON CA SCRN NOT HI [facility] IND N/A 2017    COLONOSCOPY performed by Jacques Seo MD polyp needs '25    TONSILLECTOMY  age 8     Family History   Problem Relation Age of Onset    Stroke Mother     Kidney Disease Mother     Heart Disease Father     Heart Disease Sister     Diabetes Sister     Heart Disease Brother         massive MI       Allergies   Allergen Reactions    Metformin And Related      gi    Sesame Seed (Diagnostic)     Bactrim [Sulfamethoxazole-Trimethoprim] Rash       Current Outpatient Medications   Medication Sig Dispense Refill    cefdinir (OMNICEF) 300 MG capsule Take 1 capsule by mouth 2 times daily for 10 days 20 capsule 0    albuterol sulfate  (90 Base) MCG/ACT inhaler Inhale 1-2 puffs into the lungs every 4 hours as needed for Wheezing or Shortness of Breath 1 Inhaler 0    promethazine-dextromethorphan (PROMETHAZINE-DM) 6.25-15 MG/5ML syrup Take 5-10 mLs by mouth 4 times daily as needed for Cough 200 mL 0    Insulin Degludec (TRESIBA FLEXTOUCH) 100 UNIT/ML SOPN Inject 50 Units into the skin daily 10 pen 3    insulin lispro (HUMALOG KWIKPEN) 100 UNIT/ML pen 10 units at each meals 10 pen 3    Insulin Pen Needle (NOVOFINE) 32G X 6 MM MISC qid 300 each 3    Continuous Blood Gluc Sensor (FREESTYLE JAMILA 14 DAY SENSOR) MISC Every 2 weeks 2 each 06    Continuous Blood Gluc Sensor (FREESTYLE JAMILA SENSOR SYSTEM) MISC Test bid 1 each 0    triamterene-hydrochlorothiazide (MAXZIDE-25) 37.5-25 MG per tablet TAKE 1 TABLET DAILY 90 tablet 1    amitriptyline (ELAVIL) 10 MG tablet TAKE 1 TABLET NIGHTLY 90 tablet 1    pantoprazole (PROTONIX) 40 MG tablet TAKE 1 TABLET DAILY 90 tablet 1    citalopram (CELEXA) 20 MG tablet TAKE 1 TABLET DAILY 90 tablet 1    propranolol (INDERAL) 20 MG tablet TAKE 1 TABLET TWICE A  tablet 1    mupirocin (BACTROBAN) 2 % ointment Apply topically 2 times daily 15 g 0    Insulin Pen Needle (B-D ULTRAFINE III SHORT PEN) 31G X 8 MM MISC 1 each by Does not apply route daily 100 each 3    B-D ULTRA FINE LANCETS MISC Test daily 100 each 1    Glucose Blood (BLOOD GLUCOSE TEST STRIPS) STRP Test bid and prn 100 strip 3    aspirin 81 MG tablet Take 81 mg by mouth daily      Melatonin 1 MG CAPS Take by mouth       Current Facility-Administered Medications   Medication Dose Route Frequency Provider Last Rate Last Dose    albuterol (PROVENTIL) nebulizer solution 2.5 mg  2.5 mg Nebulization Once ALEJANDRA Hoover           Review of Systems   Constitutional: Negative for chills, fever and weight loss. HENT: Negative for ear pain, postnasal drip, rhinorrhea and sore throat. Respiratory: Positive for cough, shortness of breath and wheezing.  Negative for hemoptysis, choking, chest tightness and stridor. Cardiovascular: Negative for chest pain. Musculoskeletal: Negative for myalgias. Skin: Negative for rash. Allergic/Immunologic: Negative for environmental allergies. Neurological: Negative for headaches. Objective    Vitals:    04/15/19 1723   BP: 138/82   Site: Right Upper Arm   Position: Sitting   Cuff Size: Large Adult   Pulse: 69   Resp: 16   Temp: 97.3 °F (36.3 °C)   TempSrc: Temporal   SpO2: 99%   Weight: 220 lb (99.8 kg)   Height: 5' 2\" (1.575 m)       Physical Exam   Constitutional: She appears well-developed and well-nourished. No distress. HENT:   Head: Normocephalic and atraumatic. Right Ear: External ear normal.   Left Ear: External ear normal.   Nose: Nose normal.   Mouth/Throat: Oropharynx is clear and moist. No oropharyngeal exudate. Eyes: Pupils are equal, round, and reactive to light. Conjunctivae and EOM are normal. Right eye exhibits no discharge. Left eye exhibits no discharge. No scleral icterus. Neck: No tracheal deviation present. Cardiovascular: Normal rate, regular rhythm and normal heart sounds. Exam reveals no gallop and no friction rub. No murmur heard. Pulmonary/Chest: Effort normal. No stridor. No respiratory distress. She has wheezes. She has rales. She exhibits no tenderness. Lymphadenopathy:     She has no cervical adenopathy. Skin: Skin is warm and dry. No rash noted. She is not diaphoretic. No erythema. No pallor. Vitals reviewed. Assessment and Plan      ICD-10-CM    1.  Acute bronchitis, unspecified organism J20.9 [id number] - CA INHAL RX, AIRWAY OBST/DX SPUTUM INDUCT     albuterol (PROVENTIL) nebulizer solution 2.5 mg     cefdinir (OMNICEF) 300 MG capsule     albuterol sulfate  (90 Base) MCG/ACT inhaler     promethazine-dextromethorphan (PROMETHAZINE-DM) 6.25-15 MG/5ML syrup       Orders Placed This Encounter   Procedures    20467 - CA INHAL RX, AIRWAY OBST/DX SPUTUM INDUCT     The patient was given breathing treatment using albuterol 0.083% (2.5mg/3ml) nebulizer solution. The patient tolerated the treatment well with no adverse side effects and was able to expel a large amount of phlegm. Lung sounds were improved following the treatment. Orders Placed This Encounter   Medications    albuterol (PROVENTIL) nebulizer solution 2.5 mg    cefdinir (OMNICEF) 300 MG capsule     Sig: Take 1 capsule by mouth 2 times daily for 10 days     Dispense:  20 capsule     Refill:  0    albuterol sulfate  (90 Base) MCG/ACT inhaler     Sig: Inhale 1-2 puffs into the lungs every 4 hours as needed for Wheezing or Shortness of Breath     Dispense:  1 Inhaler     Refill:  0    promethazine-dextromethorphan (PROMETHAZINE-DM) 6.25-15 MG/5ML syrup     Sig: Take 5-10 mLs by mouth 4 times daily as needed for Cough     Dispense:  200 mL     Refill:  0       Reviewed with the patient: current clinicalstatus, medications, activities and diet. Side effects, adverse effects of the medication prescribed today, as well as treatment plan and result expectations have been discussed with the patient who expressesunderstanding and desires to proceed. Close follow up to evaluate treatment results and for coordination of care. I have reviewed the patient's medical history in detail and updated the computerized patientrecord. Return if symptoms worsen or fail to improve, for follow up with PCP.     ALEJANDRA Schulte

## 2019-04-24 ENCOUNTER — HOSPITAL ENCOUNTER (OUTPATIENT)
Dept: LAB | Age: 54
Discharge: HOME | End: 2019-04-24
Payer: COMMERCIAL

## 2019-04-24 DIAGNOSIS — E11.9 TYPE 2 DIABETES MELLITUS WITHOUT COMPLICATION, WITHOUT LONG-TERM CURRENT USE OF INSULIN (HCC): ICD-10-CM

## 2019-04-24 LAB
ANION GAP SERPL CALCULATED.3IONS-SCNC: 15 MEQ/L (ref 9–15)
BUN BLDV-MCNC: 19 MG/DL (ref 6–20)
CALCIUM SERPL-MCNC: 9.2 MG/DL (ref 8.5–9.9)
CHLORIDE BLD-SCNC: 99 MEQ/L (ref 95–107)
CO2: 27 MEQ/L (ref 20–31)
CREAT SERPL-MCNC: 1.04 MG/DL (ref 0.5–0.9)
CREATININE URINE: 65.7 MG/DL
GFR AFRICAN AMERICAN: >60
GFR NON-AFRICAN AMERICAN: 54.8
GLUCOSE BLD-MCNC: 167 MG/DL (ref 70–99)
HBA1C MFR BLD: 8.8 % (ref 4.8–5.9)
MICROALBUMIN UR-MCNC: <1.2 MG/DL
MICROALBUMIN/CREAT UR-RTO: NORMAL MG/G (ref 0–30)
POTASSIUM SERPL-SCNC: 4.4 MEQ/L (ref 3.4–4.9)
SODIUM BLD-SCNC: 141 MEQ/L (ref 135–144)

## 2019-04-24 PROCEDURE — 36415 COLL VENOUS BLD VENIPUNCTURE: CPT

## 2019-04-24 PROCEDURE — 80048 BASIC METABOLIC PNL TOTAL CA: CPT

## 2019-04-24 PROCEDURE — 82570 ASSAY OF URINE CREATININE: CPT

## 2019-04-24 PROCEDURE — 83036 HEMOGLOBIN GLYCOSYLATED A1C: CPT

## 2019-04-24 PROCEDURE — 82043 UR ALBUMIN QUANTITATIVE: CPT

## 2019-04-29 ENCOUNTER — OFFICE VISIT (OUTPATIENT)
Dept: ENDOCRINOLOGY | Age: 54
End: 2019-04-29
Payer: COMMERCIAL

## 2019-04-29 VITALS
HEART RATE: 74 BPM | WEIGHT: 216 LBS | DIASTOLIC BLOOD PRESSURE: 66 MMHG | HEIGHT: 62 IN | BODY MASS INDEX: 39.75 KG/M2 | SYSTOLIC BLOOD PRESSURE: 103 MMHG

## 2019-04-29 DIAGNOSIS — E11.9 TYPE 2 DIABETES MELLITUS WITHOUT COMPLICATION, WITHOUT LONG-TERM CURRENT USE OF INSULIN (HCC): Primary | ICD-10-CM

## 2019-04-29 LAB
CHP ED QC CHECK: NORMAL
GLUCOSE BLD-MCNC: 294 MG/DL

## 2019-04-29 PROCEDURE — G8427 DOCREV CUR MEDS BY ELIG CLIN: HCPCS | Performed by: INTERNAL MEDICINE

## 2019-04-29 PROCEDURE — 99213 OFFICE O/P EST LOW 20 MIN: CPT | Performed by: INTERNAL MEDICINE

## 2019-04-29 PROCEDURE — 3045F PR MOST RECENT HEMOGLOBIN A1C LEVEL 7.0-9.0%: CPT | Performed by: INTERNAL MEDICINE

## 2019-04-29 PROCEDURE — 82962 GLUCOSE BLOOD TEST: CPT | Performed by: INTERNAL MEDICINE

## 2019-04-29 PROCEDURE — G8417 CALC BMI ABV UP PARAM F/U: HCPCS | Performed by: INTERNAL MEDICINE

## 2019-04-29 PROCEDURE — 2022F DILAT RTA XM EVC RTNOPTHY: CPT | Performed by: INTERNAL MEDICINE

## 2019-04-29 PROCEDURE — 3017F COLORECTAL CA SCREEN DOC REV: CPT | Performed by: INTERNAL MEDICINE

## 2019-04-29 PROCEDURE — 1036F TOBACCO NON-USER: CPT | Performed by: INTERNAL MEDICINE

## 2019-04-29 NOTE — PROGRESS NOTES
Subjective:      Patient ID: Montserrat Davis is a 62 y.o. female. 4 week f/u on diabetes   Diabetes   She presents for her follow-up diabetic visit. She has type 2 diabetes mellitus. Symptoms are worsening. Current diabetic treatments: treshiba plus humalog  She is currently taking insulin at bedtime, pre-breakfast, pre-lunch and pre-dinner. Her overall blood glucose range is >200 mg/dl. (Lab Results       Component                Value               Date                       LABA1C                   8.8 (H)             04/24/2019              Patient using FreeStyle haydee  system but wants to service to regular testing supplies because of cost  )         Patient Active Problem List   Diagnosis    Depression    Essential hypertension    Family history of early CAD    Type 2 diabetes mellitus without complication, without long-term current use of insulin ([facility] [address]         Review of Systems   All other systems reviewed and are negative. Vitals:    04/29/19 1557   BP: 103/66   Pulse: 74   Weight: 216 lb (98 kg)   Height: 5' 2\" (1.575 m)       Objective:   Physical Exam   Constitutional: She appears well-developed and well-nourished. HENT:   Head: Normocephalic. Eyes: EOM are normal.   Neck: Neck supple. Cardiovascular: Normal rate. Abdominal:   Obese    Musculoskeletal: Normal range of motion. Neurological: She is alert. Skin: Skin is warm. Psychiatric: She has a normal mood and affect. Assessment:       Diagnosis Orders   1.  Type 2 diabetes mellitus without complication, without long-term current use of insulin ([facility])  POCT Glucose    Basic Metabolic Panel    Hemoglobin A1C    Microalbumin / Creatinine Urine Ratio           Plan:      Orders Placed This Encounter   Procedures    Basic Metabolic Panel     Standing Status:   Future     Standing Expiration Date:   4/29/2020    Hemoglobin A1C     Standing Status:   Future     Standing Expiration Date:   4/29/2020    Microalbumin / Creatinine Urine Ratio     Standing Status:   Future     Standing Expiration Date:   2020    POCT Glucose     Increase treshiba and humalog  insulin dose     Orders Placed This Encounter   Medications    DISCONTD: Insulin Degludec (TRESIBA FLEXTOUCH) 100 UNIT/ML SOPN     Sig: Inject 60 Units into the skin daily     Dispense:  10 pen     Refill:  3    DISCONTD: insulin lispro (HUMALOG KWIKPEN) 100 UNIT/ML pen     Si units at each meals     Dispense:  10 pen     Refill:  3    Insulin Degludec (TRESIBA FLEXTOUCH) 100 UNIT/ML SOPN     Si units at bedtime 90 day supply     Dispense:  90 pen     Refill:  3    insulin lispro (HUMALOG KWIKPEN) 100 UNIT/ML pen     Si units at each meals   90 day supply     Dispense:  90 pen     Refill:  3    Insulin Pen Needle (NOVOFINE) 32G X 6 MM MISC     Sig: qid     Dispense:  400 each     Refill:  3     Hemoglobin A1c goal of 7 or lower          Madeleine Foster MD

## 2019-05-22 ENCOUNTER — TELEPHONE (OUTPATIENT)
Dept: FAMILY MEDICINE CLINIC | Age: 54
End: 2019-05-22

## 2019-05-29 ENCOUNTER — OFFICE VISIT (OUTPATIENT)
Dept: FAMILY MEDICINE CLINIC | Age: 54
End: 2019-05-29
Payer: COMMERCIAL

## 2019-05-29 VITALS
RESPIRATION RATE: 16 BRPM | TEMPERATURE: 97.2 F | DIASTOLIC BLOOD PRESSURE: 66 MMHG | HEIGHT: 62 IN | OXYGEN SATURATION: 98 % | WEIGHT: 223 LBS | SYSTOLIC BLOOD PRESSURE: 128 MMHG | BODY MASS INDEX: 41.04 KG/M2 | HEART RATE: 72 BPM

## 2019-05-29 DIAGNOSIS — I10 ESSENTIAL HYPERTENSION: ICD-10-CM

## 2019-05-29 DIAGNOSIS — E11.9 TYPE 2 DIABETES MELLITUS TREATED WITH INSULIN (HCC): Primary | ICD-10-CM

## 2019-05-29 DIAGNOSIS — K21.9 GASTROESOPHAGEAL REFLUX DISEASE WITHOUT ESOPHAGITIS: ICD-10-CM

## 2019-05-29 DIAGNOSIS — F32.A DEPRESSION, UNSPECIFIED DEPRESSION TYPE: ICD-10-CM

## 2019-05-29 DIAGNOSIS — Z23 NEED FOR SHINGLES VACCINE: ICD-10-CM

## 2019-05-29 DIAGNOSIS — Z79.4 TYPE 2 DIABETES MELLITUS TREATED WITH INSULIN (HCC): Primary | ICD-10-CM

## 2019-05-29 PROCEDURE — 3045F PR MOST RECENT HEMOGLOBIN A1C LEVEL 7.0-9.0%: CPT | Performed by: NURSE PRACTITIONER

## 2019-05-29 PROCEDURE — G8427 DOCREV CUR MEDS BY ELIG CLIN: HCPCS | Performed by: NURSE PRACTITIONER

## 2019-05-29 PROCEDURE — 1036F TOBACCO NON-USER: CPT | Performed by: NURSE PRACTITIONER

## 2019-05-29 PROCEDURE — G8417 CALC BMI ABV UP PARAM F/U: HCPCS | Performed by: NURSE PRACTITIONER

## 2019-05-29 PROCEDURE — 2022F DILAT RTA XM EVC RTNOPTHY: CPT | Performed by: NURSE PRACTITIONER

## 2019-05-29 PROCEDURE — 3017F COLORECTAL CA SCREEN DOC REV: CPT | Performed by: NURSE PRACTITIONER

## 2019-05-29 PROCEDURE — 99213 OFFICE O/P EST LOW 20 MIN: CPT | Performed by: NURSE PRACTITIONER

## 2019-05-29 RX ORDER — TRIAMTERENE AND HYDROCHLOROTHIAZIDE 37.5; 25 MG/1; MG/1
TABLET ORAL
Qty: 90 TABLET | Refills: 1 | Status: SHIPPED | OUTPATIENT
Start: 2019-05-29 | End: 2020-03-03 | Stop reason: SDUPTHER

## 2019-05-29 RX ORDER — CITALOPRAM 20 MG/1
TABLET ORAL
Qty: 90 TABLET | Refills: 1 | Status: SHIPPED | OUTPATIENT
Start: 2019-05-29 | End: 2020-03-03 | Stop reason: SDUPTHER

## 2019-05-29 RX ORDER — AMITRIPTYLINE HYDROCHLORIDE 10 MG/1
TABLET, FILM COATED ORAL
Qty: 90 TABLET | Refills: 1 | Status: SHIPPED | OUTPATIENT
Start: 2019-05-29 | End: 2020-03-03 | Stop reason: SDUPTHER

## 2019-05-29 RX ORDER — DEXTROMETHORPHAN HYDROBROMIDE AND PROMETHAZINE HYDROCHLORIDE 15; 6.25 MG/5ML; MG/5ML
5-10 SYRUP ORAL 4 TIMES DAILY PRN
Qty: 200 ML | Refills: 0 | Status: CANCELLED | OUTPATIENT
Start: 2019-05-29

## 2019-05-29 RX ORDER — PROPRANOLOL HYDROCHLORIDE 20 MG/1
TABLET ORAL
Qty: 180 TABLET | Refills: 1 | Status: SHIPPED | OUTPATIENT
Start: 2019-05-29 | End: 2020-03-03 | Stop reason: SDUPTHER

## 2019-05-29 RX ORDER — PANTOPRAZOLE SODIUM 40 MG/1
TABLET, DELAYED RELEASE ORAL
Qty: 90 TABLET | Refills: 1 | Status: SHIPPED | OUTPATIENT
Start: 2019-05-29 | End: 2020-03-03 | Stop reason: SDUPTHER

## 2019-05-29 NOTE — PROGRESS NOTES
[address] [facility], 62 y.o. female presents today with:  Chief Complaint   Patient presents with   Sharon Campbell Doctor     former patient of Dr. Penny Martinez, Patient states that she never received results of her colonoscopy done over a year ago. Mirza Fernando is here to establish primary care services at this office. Previous [facility] PCP Dr. Penny Martinez is no longer with the practice. DMII - Sees Dr. Paul Monge for management. Last HgbA1c 8.8 in late April 2019, up from 8.2 in early March 2019. Reports recent BG range 110-130. Reports compliance with current medication regimen of Tresiba and humalog. Denies episodes of hyper/hypoglycemia. HTN - Does not check BP at home. Denies chest pain, shortness of breath, palpitations, leg swelling. Depression - Reports she is doing well on current medications - Celexa and Elavil. States she has been taking these at current dose for several years, is satisfied with symptom control. Denies SI/HI. Denies headaches. GERD - Reports well-controlled with current medication - Protonix. States she knows which foods to avoid because they worsen symptoms of heartburn and water brash.               Past Medical History:   Diagnosis Date    Depression     DM2 (diabetes mellitus, type 2) (HCC)     HTN (hypertension)        Allergies   Allergen Reactions    Metformin And Related      gi    Sesame Seed (Diagnostic)     Bactrim [Sulfamethoxazole-Trimethoprim] Rash       Current Outpatient Medications on File Prior to Visit   Medication Sig Dispense Refill    Insulin Degludec (TRESIBA FLEXTOUCH) 100 UNIT/ML SOPN 60 units at bedtime 90 day supply 90 pen 3    insulin lispro (HUMALOG KWIKPEN) 100 UNIT/ML pen 20 units at each meals   90 day supply 90 pen 3    Insulin Pen Needle (NOVOFINE) 32G X 6 MM MISC qid 400 each 3    albuterol sulfate  (90 Base) MCG/ACT inhaler Inhale 1-2 puffs into the lungs every 4 hours as needed for Wheezing or Shortness of APRN - NP

## 2019-06-10 PROBLEM — E11.9 TYPE 2 DIABETES MELLITUS TREATED WITH INSULIN (HCC): Status: ACTIVE | Noted: 2019-06-10

## 2019-06-10 PROBLEM — Z79.4 TYPE 2 DIABETES MELLITUS TREATED WITH INSULIN (HCC): Status: ACTIVE | Noted: 2019-06-10

## 2019-06-10 ASSESSMENT — ENCOUNTER SYMPTOMS
VOMITING: 0
CHEST TIGHTNESS: 0
EYE REDNESS: 0
BACK PAIN: 0
EYE DISCHARGE: 0
TROUBLE SWALLOWING: 0
DIARRHEA: 0
EYE ITCHING: 0
FACIAL SWELLING: 0
SHORTNESS OF BREATH: 0
CONSTIPATION: 0
COUGH: 0
NAUSEA: 0
EYE PAIN: 0
ABDOMINAL PAIN: 0

## 2019-07-25 ENCOUNTER — HOSPITAL ENCOUNTER (OUTPATIENT)
Dept: LAB | Age: 54
Discharge: HOME | End: 2019-07-25
Payer: COMMERCIAL

## 2019-07-25 DIAGNOSIS — E11.9 TYPE 2 DIABETES MELLITUS WITHOUT COMPLICATION, WITHOUT LONG-TERM CURRENT USE OF INSULIN (HCC): ICD-10-CM

## 2019-07-25 LAB
ANION GAP SERPL CALCULATED.3IONS-SCNC: 13 MEQ/L (ref 9–15)
BUN BLDV-MCNC: 15 MG/DL (ref 6–20)
CALCIUM SERPL-MCNC: 9.3 MG/DL (ref 8.5–9.9)
CHLORIDE BLD-SCNC: 98 MEQ/L (ref 95–107)
CO2: 28 MEQ/L (ref 20–31)
CREAT SERPL-MCNC: 0.81 MG/DL (ref 0.5–0.9)
CREATININE URINE: 45.9 MG/DL
GFR AFRICAN AMERICAN: >60
GFR NON-AFRICAN AMERICAN: >60
GLUCOSE BLD-MCNC: 188 MG/DL (ref 70–99)
HBA1C MFR BLD: 7.6 % (ref 4.8–5.9)
MICROALBUMIN UR-MCNC: <1.2 MG/DL
MICROALBUMIN/CREAT UR-RTO: NORMAL MG/G (ref 0–30)
POTASSIUM SERPL-SCNC: 4.2 MEQ/L (ref 3.4–4.9)
SODIUM BLD-SCNC: 139 MEQ/L (ref 135–144)

## 2019-07-25 PROCEDURE — 83036 HEMOGLOBIN GLYCOSYLATED A1C: CPT

## 2019-07-25 PROCEDURE — 36415 COLL VENOUS BLD VENIPUNCTURE: CPT

## 2019-07-25 PROCEDURE — 82043 UR ALBUMIN QUANTITATIVE: CPT

## 2019-07-25 PROCEDURE — 80048 BASIC METABOLIC PNL TOTAL CA: CPT

## 2019-07-25 PROCEDURE — 82570 ASSAY OF URINE CREATININE: CPT

## 2019-07-29 ENCOUNTER — OFFICE VISIT (OUTPATIENT)
Dept: ENDOCRINOLOGY | Age: 54
End: 2019-07-29
Payer: COMMERCIAL

## 2019-07-29 VITALS
DIASTOLIC BLOOD PRESSURE: 80 MMHG | BODY MASS INDEX: 42.33 KG/M2 | SYSTOLIC BLOOD PRESSURE: 120 MMHG | WEIGHT: 230 LBS | HEART RATE: 74 BPM | HEIGHT: 62 IN

## 2019-07-29 DIAGNOSIS — R07.9 CHEST PAIN, UNSPECIFIED TYPE: ICD-10-CM

## 2019-07-29 DIAGNOSIS — E11.9 TYPE 2 DIABETES MELLITUS WITHOUT COMPLICATION, WITHOUT LONG-TERM CURRENT USE OF INSULIN (HCC): Primary | ICD-10-CM

## 2019-07-29 DIAGNOSIS — R60.0 LOCALIZED EDEMA: ICD-10-CM

## 2019-07-29 LAB
CHP ED QC CHECK: NORMAL
GLUCOSE BLD-MCNC: 216 MG/DL

## 2019-07-29 PROCEDURE — 1036F TOBACCO NON-USER: CPT | Performed by: INTERNAL MEDICINE

## 2019-07-29 PROCEDURE — 3017F COLORECTAL CA SCREEN DOC REV: CPT | Performed by: INTERNAL MEDICINE

## 2019-07-29 PROCEDURE — 82962 GLUCOSE BLOOD TEST: CPT | Performed by: INTERNAL MEDICINE

## 2019-07-29 PROCEDURE — G8427 DOCREV CUR MEDS BY ELIG CLIN: HCPCS | Performed by: INTERNAL MEDICINE

## 2019-07-29 PROCEDURE — G8417 CALC BMI ABV UP PARAM F/U: HCPCS | Performed by: INTERNAL MEDICINE

## 2019-07-29 PROCEDURE — 2022F DILAT RTA XM EVC RTNOPTHY: CPT | Performed by: INTERNAL MEDICINE

## 2019-07-29 PROCEDURE — 3045F PR MOST RECENT HEMOGLOBIN A1C LEVEL 7.0-9.0%: CPT | Performed by: INTERNAL MEDICINE

## 2019-07-29 PROCEDURE — 99213 OFFICE O/P EST LOW 20 MIN: CPT | Performed by: INTERNAL MEDICINE

## 2019-08-05 ENCOUNTER — OFFICE VISIT (OUTPATIENT)
Dept: CARDIOLOGY CLINIC | Age: 54
End: 2019-08-05
Payer: COMMERCIAL

## 2019-08-05 VITALS
RESPIRATION RATE: 16 BRPM | SYSTOLIC BLOOD PRESSURE: 132 MMHG | DIASTOLIC BLOOD PRESSURE: 82 MMHG | BODY MASS INDEX: 41.96 KG/M2 | WEIGHT: 228 LBS | HEART RATE: 67 BPM | HEIGHT: 62 IN | OXYGEN SATURATION: 100 %

## 2019-08-05 DIAGNOSIS — R06.02 SHORTNESS OF BREATH: ICD-10-CM

## 2019-08-05 DIAGNOSIS — I10 ESSENTIAL HYPERTENSION: ICD-10-CM

## 2019-08-05 DIAGNOSIS — E78.5 HYPERLIPIDEMIA, UNSPECIFIED HYPERLIPIDEMIA TYPE: ICD-10-CM

## 2019-08-05 DIAGNOSIS — R07.9 CHEST PAIN, UNSPECIFIED TYPE: Primary | ICD-10-CM

## 2019-08-05 PROCEDURE — 99244 OFF/OP CNSLTJ NEW/EST MOD 40: CPT | Performed by: INTERNAL MEDICINE

## 2019-08-05 PROCEDURE — G8417 CALC BMI ABV UP PARAM F/U: HCPCS | Performed by: INTERNAL MEDICINE

## 2019-08-05 PROCEDURE — G8427 DOCREV CUR MEDS BY ELIG CLIN: HCPCS | Performed by: INTERNAL MEDICINE

## 2019-08-05 PROCEDURE — 93000 ELECTROCARDIOGRAM COMPLETE: CPT | Performed by: INTERNAL MEDICINE

## 2019-08-05 RX ORDER — ATORVASTATIN CALCIUM 20 MG/1
20 TABLET, FILM COATED ORAL DAILY
Qty: 30 TABLET | Refills: 3 | Status: SHIPPED | OUTPATIENT
Start: 2019-08-05 | End: 2019-09-04 | Stop reason: SDUPTHER

## 2019-08-05 ASSESSMENT — ENCOUNTER SYMPTOMS
SHORTNESS OF BREATH: 1
COLOR CHANGE: 0
ABDOMINAL PAIN: 0
CHOKING: 0
CHEST TIGHTNESS: 0
BACK PAIN: 0
APNEA: 0
ABDOMINAL DISTENTION: 0

## 2019-08-11 ASSESSMENT — ENCOUNTER SYMPTOMS: SHORTNESS OF BREATH: 1

## 2019-08-11 NOTE — PROGRESS NOTES
Subjective:      Patient ID: Tommie Green is a 62 y.o. female. 3 month f/u on diabetes   Diabetes   She presents for her follow-up diabetic visit. She has type 2 diabetes mellitus. Associated symptoms include chest pain. Symptoms are improving. Current diabetic treatment includes insulin injections (treshiba plus humalog ). She is currently taking insulin pre-breakfast, pre-lunch, pre-dinner and at bedtime. Her overall blood glucose range is 180-200 mg/dl. (Lab Results       Component                Value               Date                       LABA1C                   7.6 (H)             07/25/2019            )        c/o chest pain shortness of breath   History of heart disease in family      reviewed labs  Results for Rosario Winchester (MRN [id number]) as of 8/11/2019 17:29   Ref. Range 3/1/2019 09:06 4/24/2019 09:28 7/25/2019 08:04   Hemoglobin A1C Latest Ref Range: 4.8 - 5.9 % 8.2 (H) 8.8 (H) 7.6 (H)     Results for Rosario Winchester (MRN [id number]) as of 8/11/2019 17:29   Ref.  Range 7/25/2019 08:04   Sodium Latest Ref Range: 135 - 144 mEq/L 139   Potassium Latest Ref Range: 3.4 - 4.9 mEq/L 4.2   Chloride Latest Ref Range: 95 - 107 mEq/L 98   CO2 Latest Ref Range: 20 - 31 mEq/L 28   BUN Latest Ref Range: 6 - 20 mg/dL 15   Creatinine Latest Ref Range: 0.50 - 0.90 mg/dL 0.81   Anion Gap Latest Ref Range: 9 - 15 mEq/L 13   GFR Non- Latest Ref Range: >60  >60.0   GFR African American Latest Ref Range: >60  >60.0   Glucose Latest Ref Range: 70 - 99 mg/dL 188 (H)   Calcium Latest Ref Range: 8.5 - 9.9 mg/dL 9.3   Hemoglobin A1C Latest Ref Range: 4.8 - 5.9 % 7.6 (H)   Creatinine, Ur Latest Ref Range: Not Established mg/dL 45.9   Microalbumin Creatinine Ratio Latest Ref Range: 0.0 - 30.0 mg/G see below   Microalbumin, Random Urine Latest Ref Range: Not Established mg/dL <1.20     Patient Active Problem List   Diagnosis    Depression    Essential hypertension    Family history of early CAD    Type

## 2019-08-27 ENCOUNTER — HOSPITAL ENCOUNTER (OUTPATIENT)
Dept: NON INVASIVE DIAGNOSTICS | Age: 54
Discharge: HOME | End: 2019-08-27
Payer: COMMERCIAL

## 2019-08-27 DIAGNOSIS — R07.9 CHEST PAIN, UNSPECIFIED TYPE: ICD-10-CM

## 2019-08-27 DIAGNOSIS — R06.02 SHORTNESS OF BREATH: ICD-10-CM

## 2019-08-27 DIAGNOSIS — I10 ESSENTIAL HYPERTENSION: ICD-10-CM

## 2019-08-27 LAB
LV EF: 60 %
LVEF MODALITY: NORMAL

## 2019-08-27 PROCEDURE — 93017 CV STRESS TEST TRACING ONLY: CPT

## 2019-08-27 PROCEDURE — 93306 TTE W/DOPPLER COMPLETE: CPT | Performed by: INTERNAL MEDICINE

## 2019-08-27 PROCEDURE — 93306 TTE W/DOPPLER COMPLETE: CPT

## 2019-08-27 PROCEDURE — 93350 STRESS TTE ONLY: CPT

## 2019-09-04 ENCOUNTER — OFFICE VISIT (OUTPATIENT)
Dept: CARDIOLOGY CLINIC | Age: 54
End: 2019-09-04
Payer: COMMERCIAL

## 2019-09-04 VITALS
BODY MASS INDEX: 42.14 KG/M2 | DIASTOLIC BLOOD PRESSURE: 68 MMHG | HEART RATE: 72 BPM | WEIGHT: 229 LBS | HEIGHT: 62 IN | RESPIRATION RATE: 18 BRPM | OXYGEN SATURATION: 98 % | SYSTOLIC BLOOD PRESSURE: 124 MMHG

## 2019-09-04 DIAGNOSIS — I10 ESSENTIAL HYPERTENSION: Primary | ICD-10-CM

## 2019-09-04 DIAGNOSIS — E78.5 HYPERLIPIDEMIA, UNSPECIFIED HYPERLIPIDEMIA TYPE: ICD-10-CM

## 2019-09-04 DIAGNOSIS — R07.9 CHEST PAIN, UNSPECIFIED TYPE: ICD-10-CM

## 2019-09-04 PROCEDURE — G8417 CALC BMI ABV UP PARAM F/U: HCPCS | Performed by: INTERNAL MEDICINE

## 2019-09-04 PROCEDURE — 1036F TOBACCO NON-USER: CPT | Performed by: INTERNAL MEDICINE

## 2019-09-04 PROCEDURE — 3017F COLORECTAL CA SCREEN DOC REV: CPT | Performed by: INTERNAL MEDICINE

## 2019-09-04 PROCEDURE — 99213 OFFICE O/P EST LOW 20 MIN: CPT | Performed by: INTERNAL MEDICINE

## 2019-09-04 PROCEDURE — G8427 DOCREV CUR MEDS BY ELIG CLIN: HCPCS | Performed by: INTERNAL MEDICINE

## 2019-09-04 RX ORDER — ATORVASTATIN CALCIUM 20 MG/1
20 TABLET, FILM COATED ORAL DAILY
Qty: 90 TABLET | Refills: 2 | Status: SHIPPED | OUTPATIENT
Start: 2019-09-04 | End: 2020-06-04

## 2019-09-04 NOTE — PROGRESS NOTES
normal. No wheezes. No rales. No tenderness. Abdominal: Soft. Bowel sounds are normal.   Musculoskeletal: Normal range of motion. No edema. Neurological: The patient is alert and oriented to person, place, and time. Intact cranial nerves. Skin: Skin is warm and dry. No rash noted.        LABS:  CBC:   Lab Results   Component Value Date    WBC 10.7 11/22/2016    RBC 5.04 11/22/2016    HGB 14.7 11/22/2016    HCT 41.8 11/22/2016    MCV 83.0 11/22/2016    MCH 29.3 11/22/2016    MCHC 35.3 11/22/2016    RDW 12.8 11/22/2016     11/22/2016     Lipids:  Lab Results   Component Value Date    CHOL 164 03/01/2019    CHOL 160 07/09/2018    CHOL 157 06/05/2017     Lab Results   Component Value Date    TRIG 175 (H) 03/01/2019    TRIG 133 07/09/2018    TRIG 167 06/05/2017     Lab Results   Component Value Date    HDL 38 (L) 03/01/2019    HDL 40 07/09/2018    HDL 38 (L) 06/05/2017     Lab Results   Component Value Date    LDLCALC 91 03/01/2019    LDLCALC 93 07/09/2018    LDLCALC 86 06/05/2017     No results found for: LABVLDL, VLDL  No results found for: CHOLHDLRATIO  CMP:    Lab Results   Component Value Date     07/25/2019    K 4.2 07/25/2019    CL 98 07/25/2019    CO2 28 07/25/2019    BUN 15 07/25/2019    CREATININE 0.81 07/25/2019    GFRAA >60.0 07/25/2019    LABGLOM >60.0 07/25/2019    GLUCOSE 216 07/29/2019    PROT 6.7 03/01/2019    LABALBU 4.2 03/01/2019    CALCIUM 9.3 07/25/2019    BILITOT 0.4 03/01/2019    ALKPHOS 88 03/01/2019    AST 31 03/01/2019    ALT 37 03/01/2019     BMP:    Lab Results   Component Value Date     07/25/2019    K 4.2 07/25/2019    CL 98 07/25/2019    CO2 28 07/25/2019    BUN 15 07/25/2019    LABALBU 4.2 03/01/2019    CREATININE 0.81 07/25/2019    CALCIUM 9.3 07/25/2019    GFRAA >60.0 07/25/2019    LABGLOM >60.0 07/25/2019    GLUCOSE 216 07/29/2019     Magnesium:  No results found for: MG  TSH:  Lab Results   Component Value Date    TSH 1.830 01/31/2017       Patient Active Problem List   Diagnosis    Depression    Essential hypertension    Family history of early CAD    Type 2 diabetes mellitus treated with insulin ([facility] [address]       Medications:  Current Outpatient Medications   Medication Sig Dispense Refill    atorvastatin (LIPITOR) 20 MG tablet Take 1 tablet by mouth daily 90 tablet 2    Insulin Degludec (TRESIBA FLEXTOUCH) 100 UNIT/ML SOPN 70 units at bedtime 90 day supply 90 pen 3    insulin lispro (HUMALOG KWIKPEN) 100 UNIT/ML pen 20 units am and 25-30 units lunch and dinner    90 day supply 90 pen 3    triamterene-hydrochlorothiazide (MAXZIDE-25) 37.5-25 MG per tablet TAKE 1 TABLET DAILY 90 tablet 1    amitriptyline (ELAVIL) 10 MG tablet TAKE 1 TABLET NIGHTLY 90 tablet 1    pantoprazole (PROTONIX) 40 MG tablet TAKE 1 TABLET DAILY 90 tablet 1    citalopram (CELEXA) 20 MG tablet TAKE 1 TABLET DAILY 90 tablet 1    propranolol (INDERAL) 20 MG tablet TAKE 1 TABLET TWICE A  tablet 1    zoster recombinant adjuvanted vaccine (SHINGRIX) 50 MCG/0.5ML SUSR injection Inject 0.5 mLs into the muscle See Admin Instructions 1 dose now and repeat in 2-6 months 0.5 mL 0    Insulin Pen Needle (NOVOFINE) 32G X 6 MM MISC qid 400 each 3    albuterol sulfate  (90 Base) MCG/ACT inhaler Inhale 1-2 puffs into the lungs every 4 hours as needed for Wheezing or Shortness of Breath 1 Inhaler 0    promethazine-dextromethorphan (PROMETHAZINE-DM) 6.25-15 MG/5ML syrup Take 5-10 mLs by mouth 4 times daily as needed for Cough 200 mL 0    Insulin Pen Needle (B-D ULTRAFINE III SHORT PEN) 31G X 8 MM MISC 1 each by Does not apply route daily 100 each 3    B-D ULTRA FINE LANCETS MISC Test daily 100 each 1    Glucose Blood (BLOOD GLUCOSE TEST STRIPS) STRP Test bid and prn 100 strip 3    aspirin 81 MG tablet Take 81 mg by mouth daily      Melatonin 1 MG CAPS Take by mouth       No current facility-administered medications for this visit. Assessment/Plan:    1.  Essential

## 2019-10-23 ENCOUNTER — HOSPITAL ENCOUNTER (OUTPATIENT)
Dept: LAB | Age: 54
Discharge: HOME | End: 2019-10-23
Payer: COMMERCIAL

## 2019-10-23 DIAGNOSIS — E11.9 TYPE 2 DIABETES MELLITUS WITHOUT COMPLICATION, WITHOUT LONG-TERM CURRENT USE OF INSULIN (HCC): ICD-10-CM

## 2019-10-23 LAB
ANION GAP SERPL CALCULATED.3IONS-SCNC: 13 MEQ/L (ref 9–15)
BUN BLDV-MCNC: 18 MG/DL (ref 6–20)
CALCIUM SERPL-MCNC: 9.4 MG/DL (ref 8.5–9.9)
CHLORIDE BLD-SCNC: 98 MEQ/L (ref 95–107)
CO2: 29 MEQ/L (ref 20–31)
CREAT SERPL-MCNC: 0.84 MG/DL (ref 0.5–0.9)
CREATININE URINE: 67.1 MG/DL
GFR AFRICAN AMERICAN: >60
GFR NON-AFRICAN AMERICAN: >60
GLUCOSE BLD-MCNC: 150 MG/DL (ref 70–99)
HBA1C MFR BLD: 7.5 % (ref 4.8–5.9)
MICROALBUMIN UR-MCNC: <1.2 MG/DL
MICROALBUMIN/CREAT UR-RTO: NORMAL MG/G (ref 0–30)
POTASSIUM SERPL-SCNC: 4.6 MEQ/L (ref 3.4–4.9)
SODIUM BLD-SCNC: 140 MEQ/L (ref 135–144)

## 2019-10-23 PROCEDURE — 82043 UR ALBUMIN QUANTITATIVE: CPT

## 2019-10-23 PROCEDURE — 82570 ASSAY OF URINE CREATININE: CPT

## 2019-10-23 PROCEDURE — 83036 HEMOGLOBIN GLYCOSYLATED A1C: CPT

## 2019-10-23 PROCEDURE — 36415 COLL VENOUS BLD VENIPUNCTURE: CPT

## 2019-10-23 PROCEDURE — 80048 BASIC METABOLIC PNL TOTAL CA: CPT

## 2019-10-29 ENCOUNTER — OFFICE VISIT (OUTPATIENT)
Dept: ENDOCRINOLOGY | Age: 54
End: 2019-10-29
Payer: COMMERCIAL

## 2019-10-29 VITALS
HEART RATE: 77 BPM | BODY MASS INDEX: 42.88 KG/M2 | DIASTOLIC BLOOD PRESSURE: 84 MMHG | SYSTOLIC BLOOD PRESSURE: 130 MMHG | HEIGHT: 62 IN | WEIGHT: 233 LBS

## 2019-10-29 DIAGNOSIS — E11.9 TYPE 2 DIABETES MELLITUS TREATED WITH INSULIN (HCC): Primary | ICD-10-CM

## 2019-10-29 DIAGNOSIS — Z79.4 TYPE 2 DIABETES MELLITUS TREATED WITH INSULIN (HCC): Primary | ICD-10-CM

## 2019-10-29 LAB
CHP ED QC CHECK: NORMAL
GLUCOSE BLD-MCNC: 169 MG/DL

## 2019-10-29 PROCEDURE — 3017F COLORECTAL CA SCREEN DOC REV: CPT | Performed by: INTERNAL MEDICINE

## 2019-10-29 PROCEDURE — 2022F DILAT RTA XM EVC RTNOPTHY: CPT | Performed by: INTERNAL MEDICINE

## 2019-10-29 PROCEDURE — G8417 CALC BMI ABV UP PARAM F/U: HCPCS | Performed by: INTERNAL MEDICINE

## 2019-10-29 PROCEDURE — 82962 GLUCOSE BLOOD TEST: CPT | Performed by: INTERNAL MEDICINE

## 2019-10-29 PROCEDURE — 1036F TOBACCO NON-USER: CPT | Performed by: INTERNAL MEDICINE

## 2019-10-29 PROCEDURE — 3051F HG A1C>EQUAL 7.0%<8.0%: CPT | Performed by: INTERNAL MEDICINE

## 2019-10-29 PROCEDURE — 99213 OFFICE O/P EST LOW 20 MIN: CPT | Performed by: INTERNAL MEDICINE

## 2019-10-29 PROCEDURE — G8427 DOCREV CUR MEDS BY ELIG CLIN: HCPCS | Performed by: INTERNAL MEDICINE

## 2019-10-29 PROCEDURE — G8484 FLU IMMUNIZE NO ADMIN: HCPCS | Performed by: INTERNAL MEDICINE

## 2019-12-09 ENCOUNTER — OFFICE VISIT (OUTPATIENT)
Dept: FAMILY MEDICINE CLINIC | Age: 54
End: 2019-12-09
Payer: COMMERCIAL

## 2019-12-09 VITALS
HEART RATE: 72 BPM | SYSTOLIC BLOOD PRESSURE: 102 MMHG | WEIGHT: 233.2 LBS | RESPIRATION RATE: 16 BRPM | DIASTOLIC BLOOD PRESSURE: 68 MMHG | TEMPERATURE: 98.4 F | HEIGHT: 62 IN | BODY MASS INDEX: 42.91 KG/M2 | OXYGEN SATURATION: 98 %

## 2019-12-09 DIAGNOSIS — I10 ESSENTIAL HYPERTENSION: ICD-10-CM

## 2019-12-09 DIAGNOSIS — E78.2 MIXED HYPERLIPIDEMIA: ICD-10-CM

## 2019-12-09 DIAGNOSIS — Z79.4 TYPE 2 DIABETES MELLITUS TREATED WITH INSULIN (HCC): Primary | ICD-10-CM

## 2019-12-09 DIAGNOSIS — R19.7 DIARRHEA, UNSPECIFIED TYPE: ICD-10-CM

## 2019-12-09 DIAGNOSIS — F32.A DEPRESSION, UNSPECIFIED DEPRESSION TYPE: ICD-10-CM

## 2019-12-09 DIAGNOSIS — E11.9 TYPE 2 DIABETES MELLITUS TREATED WITH INSULIN (HCC): Primary | ICD-10-CM

## 2019-12-09 PROCEDURE — 1036F TOBACCO NON-USER: CPT | Performed by: PHYSICIAN ASSISTANT

## 2019-12-09 PROCEDURE — G8484 FLU IMMUNIZE NO ADMIN: HCPCS | Performed by: PHYSICIAN ASSISTANT

## 2019-12-09 PROCEDURE — G8427 DOCREV CUR MEDS BY ELIG CLIN: HCPCS | Performed by: PHYSICIAN ASSISTANT

## 2019-12-09 PROCEDURE — G8417 CALC BMI ABV UP PARAM F/U: HCPCS | Performed by: PHYSICIAN ASSISTANT

## 2019-12-09 PROCEDURE — 2022F DILAT RTA XM EVC RTNOPTHY: CPT | Performed by: PHYSICIAN ASSISTANT

## 2019-12-09 PROCEDURE — 3017F COLORECTAL CA SCREEN DOC REV: CPT | Performed by: PHYSICIAN ASSISTANT

## 2019-12-09 PROCEDURE — 3051F HG A1C>EQUAL 7.0%<8.0%: CPT | Performed by: PHYSICIAN ASSISTANT

## 2019-12-09 PROCEDURE — 99214 OFFICE O/P EST MOD 30 MIN: CPT | Performed by: PHYSICIAN ASSISTANT

## 2019-12-09 RX ORDER — GREEN TEA/HOODIA GORDONII 315-12.5MG
1 CAPSULE ORAL 2 TIMES DAILY
Qty: 60 TABLET | Refills: 2 | Status: SHIPPED | OUTPATIENT
Start: 2019-12-09 | End: 2020-01-08

## 2019-12-09 ASSESSMENT — ENCOUNTER SYMPTOMS
SHORTNESS OF BREATH: 0
WHEEZING: 0
CONSTIPATION: 0
BLOOD IN STOOL: 0
TROUBLE SWALLOWING: 0
CHEST TIGHTNESS: 0
RECTAL PAIN: 0
COLOR CHANGE: 0
VOMITING: 0
DIARRHEA: 1
NAUSEA: 0
BACK PAIN: 0
ABDOMINAL PAIN: 1
ABDOMINAL DISTENTION: 1
COUGH: 0

## 2019-12-10 ENCOUNTER — HOSPITAL ENCOUNTER (OUTPATIENT)
Dept: LAB | Age: 54
Discharge: HOME | End: 2019-12-10
Payer: COMMERCIAL

## 2019-12-10 DIAGNOSIS — I10 ESSENTIAL HYPERTENSION: ICD-10-CM

## 2019-12-10 DIAGNOSIS — R19.7 DIARRHEA, UNSPECIFIED TYPE: ICD-10-CM

## 2019-12-10 DIAGNOSIS — E11.9 TYPE 2 DIABETES MELLITUS TREATED WITH INSULIN (HCC): ICD-10-CM

## 2019-12-10 DIAGNOSIS — Z79.4 TYPE 2 DIABETES MELLITUS TREATED WITH INSULIN (HCC): ICD-10-CM

## 2019-12-10 LAB
ALBUMIN SERPL-MCNC: 4.1 G/DL (ref 3.5–4.6)
ALP BLD-CCNC: 108 U/L (ref 40–130)
ALT SERPL-CCNC: 28 U/L (ref 0–33)
AST SERPL-CCNC: 23 U/L (ref 0–35)
BILIRUB SERPL-MCNC: 0.5 MG/DL (ref 0.2–0.7)
BILIRUBIN DIRECT: <0.2 MG/DL (ref 0–0.4)
BILIRUBIN, INDIRECT: NORMAL MG/DL (ref 0–0.6)
PROCALCITONIN: 0.08 NG/ML (ref 0–0.15)
TOTAL PROTEIN: 6.8 G/DL (ref 6.3–8)

## 2019-12-10 PROCEDURE — 80076 HEPATIC FUNCTION PANEL: CPT

## 2019-12-10 PROCEDURE — 36415 COLL VENOUS BLD VENIPUNCTURE: CPT

## 2019-12-10 PROCEDURE — 84145 PROCALCITONIN (PCT): CPT

## 2019-12-11 ENCOUNTER — HOSPITAL ENCOUNTER (OUTPATIENT)
Age: 54
Setting detail: SPECIMEN
Discharge: HOME | End: 2019-12-11
Payer: COMMERCIAL

## 2019-12-11 DIAGNOSIS — R19.7 DIARRHEA, UNSPECIFIED TYPE: ICD-10-CM

## 2019-12-11 PROCEDURE — 87506 IADNA-DNA/RNA PROBE TQ 6-11: CPT

## 2019-12-11 PROCEDURE — 87324 CLOSTRIDIUM AG IA: CPT

## 2019-12-11 PROCEDURE — 87449 NOS EACH ORGANISM AG IA: CPT

## 2019-12-12 LAB — GI BACTERIAL PATHOGENS BY PCR: NORMAL

## 2019-12-13 LAB — C DIFF TOXIN/ANTIGEN: NORMAL

## 2020-01-20 ENCOUNTER — OFFICE VISIT (OUTPATIENT)
Dept: FAMILY MEDICINE CLINIC | Age: 55
End: 2020-01-20
Payer: COMMERCIAL

## 2020-01-20 VITALS
HEART RATE: 71 BPM | WEIGHT: 237 LBS | SYSTOLIC BLOOD PRESSURE: 112 MMHG | HEIGHT: 62 IN | TEMPERATURE: 98.2 F | RESPIRATION RATE: 16 BRPM | DIASTOLIC BLOOD PRESSURE: 72 MMHG | OXYGEN SATURATION: 99 % | BODY MASS INDEX: 43.61 KG/M2

## 2020-01-20 PROBLEM — R19.5 LOOSE STOOLS: Status: ACTIVE | Noted: 2020-01-20

## 2020-01-20 PROBLEM — R19.7 DIARRHEA: Status: RESOLVED | Noted: 2019-12-09 | Resolved: 2020-01-20

## 2020-01-20 PROBLEM — K58.0 IRRITABLE BOWEL SYNDROME WITH DIARRHEA: Status: ACTIVE | Noted: 2020-01-20

## 2020-01-20 PROCEDURE — G8484 FLU IMMUNIZE NO ADMIN: HCPCS | Performed by: PHYSICIAN ASSISTANT

## 2020-01-20 PROCEDURE — 1036F TOBACCO NON-USER: CPT | Performed by: PHYSICIAN ASSISTANT

## 2020-01-20 PROCEDURE — G8427 DOCREV CUR MEDS BY ELIG CLIN: HCPCS | Performed by: PHYSICIAN ASSISTANT

## 2020-01-20 PROCEDURE — 3046F HEMOGLOBIN A1C LEVEL >9.0%: CPT | Performed by: PHYSICIAN ASSISTANT

## 2020-01-20 PROCEDURE — G8417 CALC BMI ABV UP PARAM F/U: HCPCS | Performed by: PHYSICIAN ASSISTANT

## 2020-01-20 PROCEDURE — 2022F DILAT RTA XM EVC RTNOPTHY: CPT | Performed by: PHYSICIAN ASSISTANT

## 2020-01-20 PROCEDURE — 99213 OFFICE O/P EST LOW 20 MIN: CPT | Performed by: PHYSICIAN ASSISTANT

## 2020-01-20 PROCEDURE — 3017F COLORECTAL CA SCREEN DOC REV: CPT | Performed by: PHYSICIAN ASSISTANT

## 2020-01-20 RX ORDER — DICYCLOMINE HYDROCHLORIDE 10 MG/1
10 CAPSULE ORAL 4 TIMES DAILY
Qty: 120 CAPSULE | Refills: 3 | Status: SHIPPED | OUTPATIENT
Start: 2020-01-20 | End: 2020-03-04

## 2020-01-20 RX ORDER — NICOTINE 14MG/24HR
PATCH, TRANSDERMAL 24 HOURS TRANSDERMAL
COMMUNITY
End: 2020-10-09 | Stop reason: ALTCHOICE

## 2020-01-20 ASSESSMENT — ENCOUNTER SYMPTOMS
RECTAL PAIN: 0
CHEST TIGHTNESS: 0
COUGH: 0
ABDOMINAL DISTENTION: 1
COLOR CHANGE: 0
ANAL BLEEDING: 0
DIARRHEA: 1
NAUSEA: 0
SHORTNESS OF BREATH: 0
WHEEZING: 0

## 2020-01-20 NOTE — PROGRESS NOTES
[address] [facility], 62 y.o. female presents today with:  Chief Complaint   Patient presents with    Diarrhea     follow up, still has not had a solid     Diabetes     glucose numbers are doing good      HPI  Josh Keyes is in the office today for follow up. Last OV with me: 12/9/2019 as a new patient. Type 2 diabetes. Routinely followed by endocrinology, Dr. Nguyen Reese. Last OV was on 10/29/2019. HgbA1c of 7.5%. Current therapy: insulin: tresiba and humalog. Did have a couple episodes of hypoglycemia--sugar as low as 48. Corrected with glucose tabs.       Diarrhea. follow up. Previously loose stools throughout the day with up to 10 times/day. Was started on probiotic. Amount and frequency has lessened, still having loose stools. Denies hematochezia, melena. Screening colonoscopy was in 2017--2 polyps which were removed. +Mild abdominal bloating/cramping prior to having bowel movement.  +family history of colitis and IBS. Stool cultures were ordered for patient. C-diff, negative; procalcitonin: normal range. GI panel: negative. Review of Systems   Constitutional: Negative for activity change, appetite change, diaphoresis, fatigue, fever and unexpected weight change. Respiratory: Negative for cough, chest tightness, shortness of breath and wheezing. Cardiovascular: Negative for chest pain, palpitations and leg swelling. Gastrointestinal: Positive for abdominal distention and diarrhea. Negative for anal bleeding, nausea and rectal pain. Endocrine: Negative for polydipsia, polyphagia and polyuria. Skin: Negative for color change. Psychiatric/Behavioral: Negative for dysphoric mood and sleep disturbance. The patient is not nervous/anxious.       Past Medical History:   Diagnosis Date    Depression     DM2 (diabetes mellitus, type 2) ([facility] [address]     HTN (hypertension)      Past Surgical History:   Procedure Laterality Date    CATARACT REMOVAL Bilateral 1/2019, 2019     SECTION  1982/3/96    x3    [place] COLON CA SCRN NOT HI RSK IND N/A 2017    COLONOSCOPY performed by Alice Peterson MD polyp needs '25    TONSILLECTOMY  age 8     Social History     Socioeconomic History    Marital status: Single     Spouse name: Not on file    Number of children: Not on file    Years of education: Not on file    Highest education level: Not on file   Occupational History    Not on file   Social Needs    Financial resource strain: Not on file    Food insecurity:     Worry: Not on file     Inability: Not on file    Transportation needs:     Medical: Not on file     Non-medical: Not on file   Tobacco Use    Smoking status: Never Smoker    Smokeless tobacco: Never Used   Substance and Sexual Activity    Alcohol use:  Yes     Alcohol/week: 0.0 standard drinks     Comment: occ    Drug use: No    Sexual activity: Yes   Lifestyle    Physical activity:     Days per week: Not on file     Minutes per session: Not on file    Stress: Not on file   Relationships    Social connections:     Talks on phone: Not on file     Gets together: Not on file     Attends Protestant service: Not on file     Active member of club or organization: Not on file     Attends meetings of clubs or organizations: Not on file     Relationship status: Not on file    Intimate partner violence:     Fear of current or ex partner: Not on file     Emotionally abused: Not on file     Physically abused: Not on file     Forced sexual activity: Not on file   Other Topics Concern    Not on file   Social History Narrative    Not on file     Family History   Problem Relation Age of Onset    Stroke Mother     Kidney Disease Mother     Heart Disease Father     Heart Disease Sister     Diabetes Sister     Heart Disease Brother         massive MI        Allergies   Allergen Reactions    Metformin And Related      gi    Sesame Seed (Diagnostic)     Bactrim [Sulfamethoxazole-Trimethoprim] Rash     Current Outpatient Medications   Medication Sig Dispense Refill    Saccharomyces boulardii (PROBIOTIC) 250 MG CAPS Take by mouth      dicyclomine (BENTYL) 10 MG capsule Take 1 capsule by mouth 4 times daily For loose stool. 120 capsule 3    Psyllium (METAMUCIL) 28.3 % POWD Mix 1 scoop into water 1-2 times daily. 538 g 1    atorvastatin (LIPITOR) 20 MG tablet Take 1 tablet by mouth daily 90 tablet 2    Insulin Degludec (TRESIBA FLEXTOUCH) 100 UNIT/ML SOPN 70 units at bedtime 90 day supply 90 pen 3    insulin lispro (HUMALOG KWIKPEN) 100 UNIT/ML pen 20 units am and 25-30 units lunch and dinner    90 day supply (Patient taking differently: 20 units am and 25 lunch and 30 dinner       90 day supply) 90 pen 3    triamterene-hydrochlorothiazide (MAXZIDE-25) 37.5-25 MG per tablet TAKE 1 TABLET DAILY 90 tablet 1    amitriptyline (ELAVIL) 10 MG tablet TAKE 1 TABLET NIGHTLY 90 tablet 1    pantoprazole (PROTONIX) 40 MG tablet TAKE 1 TABLET DAILY 90 tablet 1    citalopram (CELEXA) 20 MG tablet TAKE 1 TABLET DAILY 90 tablet 1    propranolol (INDERAL) 20 MG tablet TAKE 1 TABLET TWICE A  tablet 1    Insulin Pen Needle (NOVOFINE) 32G X 6 MM MISC qid 400 each 3    albuterol sulfate  (90 Base) MCG/ACT inhaler Inhale 1-2 puffs into the lungs every 4 hours as needed for Wheezing or Shortness of Breath 1 Inhaler 0    Insulin Pen Needle (B-D ULTRAFINE III SHORT PEN) 31G X 8 MM MISC 1 each by Does not apply route daily 100 each 3    B-D ULTRA FINE LANCETS MISC Test daily 100 each 1    Glucose Blood (BLOOD GLUCOSE TEST STRIPS) STRP Test bid and prn 100 strip 3    aspirin 81 MG tablet Take 81 mg by mouth daily      Melatonin 1 MG CAPS Take by mouth       No current facility-administered medications for this visit. PMH, Surgical Hx, Family Hx, and Social Hx reviewed and updated. Health Maintenance reviewed.     Objective  Vitals:    01/20/20 1726   BP: 112/72   Site: Left Upper Arm   Position: Sitting   Cuff Size: Large Adult   Pulse: 71   Resp: 16   Temp: 98.2 °F (36.8 °C)   TempSrc: Tympanic   SpO2: 99%   Weight: 237 lb (107.5 kg)   Height: 5' 2\" (1.575 m)     BP Readings from Last 3 Encounters:   01/20/20 112/72   12/09/19 102/68   10/29/19 130/84     Wt Readings from Last 3 Encounters:   01/20/20 237 lb (107.5 kg)   12/09/19 233 lb 3.2 oz (105.8 kg)   10/29/19 233 lb (105.7 kg)     Physical Exam  Vitals signs and nursing note reviewed. Constitutional:       General: She is not in acute distress. Appearance: She is well-developed. She is obese. She is not ill-appearing or diaphoretic. Comments: Sitting comfortably in exam room. NAD. HENT:      Head: Normocephalic and atraumatic. Right Ear: Tympanic membrane, ear canal and external ear normal.      Left Ear: Tympanic membrane, ear canal and external ear normal.      Nose: Nose normal.   Eyes:      Conjunctiva/sclera: Conjunctivae normal.   Neck:      Musculoskeletal: Normal range of motion and neck supple. Cardiovascular:      Rate and Rhythm: Normal rate and regular rhythm. Heart sounds: Normal heart sounds. Pulmonary:      Effort: Pulmonary effort is normal. No respiratory distress. Breath sounds: Normal breath sounds. Musculoskeletal: Normal range of motion. Skin:     General: Skin is warm and dry. Neurological:      Mental Status: She is alert and oriented to person, place, and time. Psychiatric:         Attention and Perception: Attention normal.         Mood and Affect: Mood normal.         Speech: Speech normal.         Behavior: Behavior normal.         Thought Content: Thought content normal.         Cognition and Memory: Cognition normal.         Judgment: Judgment normal.      Comments: Doing well; stress better controlled. New granddaughter born 12/26. Assessment & Plan    Diagnosis Orders   1. Type 2 diabetes mellitus treated with insulin ([address]     2.  Loose stools  Saccharomyces boulardii (PROBIOTIC) 250 MG CAPS

## 2020-01-23 ENCOUNTER — OFFICE VISIT (OUTPATIENT)
Dept: GASTROENTEROLOGY | Age: 55
End: 2020-01-23
Payer: COMMERCIAL

## 2020-01-23 VITALS
DIASTOLIC BLOOD PRESSURE: 70 MMHG | SYSTOLIC BLOOD PRESSURE: 128 MMHG | OXYGEN SATURATION: 98 % | TEMPERATURE: 98 F | WEIGHT: 240 LBS | BODY MASS INDEX: 44.16 KG/M2 | HEIGHT: 62 IN | HEART RATE: 77 BPM

## 2020-01-23 PROCEDURE — G8417 CALC BMI ABV UP PARAM F/U: HCPCS | Performed by: INTERNAL MEDICINE

## 2020-01-23 PROCEDURE — G8484 FLU IMMUNIZE NO ADMIN: HCPCS | Performed by: INTERNAL MEDICINE

## 2020-01-23 PROCEDURE — 1036F TOBACCO NON-USER: CPT | Performed by: INTERNAL MEDICINE

## 2020-01-23 PROCEDURE — G8427 DOCREV CUR MEDS BY ELIG CLIN: HCPCS | Performed by: INTERNAL MEDICINE

## 2020-01-23 PROCEDURE — 3017F COLORECTAL CA SCREEN DOC REV: CPT | Performed by: INTERNAL MEDICINE

## 2020-01-23 PROCEDURE — 99203 OFFICE O/P NEW LOW 30 MIN: CPT | Performed by: INTERNAL MEDICINE

## 2020-01-23 RX ORDER — LACTOBACIL 2/BIFIDO 1/S.THERMO 450B CELL
PACKET (EA) ORAL
Qty: 30 EACH | Refills: 0 | Status: SHIPPED | OUTPATIENT
Start: 2020-01-23 | End: 2020-10-09 | Stop reason: ALTCHOICE

## 2020-01-23 RX ORDER — DIPHENOXYLATE HYDROCHLORIDE AND ATROPINE SULFATE 2.5; .025 MG/1; MG/1
1 TABLET ORAL 4 TIMES DAILY PRN
Qty: 30 TABLET | Refills: 1 | Status: SHIPPED | OUTPATIENT
Start: 2020-01-23 | End: 2020-02-22

## 2020-01-23 NOTE — PROGRESS NOTES
Gastroenterology Clinic Visit    Kimberley Butt  [id number]  Chief Complaint   Patient presents with    Consultation     HPI: 62 y.o. female presents to the clinic with symptoms of loose bowel movements since 2019. Symptoms started in mid November with mucus loose stools and slimy stools. Patient has had persistence of symptoms over the last 2-1/2 months. Patient has been evaluated by her primary care provider and is on Bentyl with no response. Patient also has been taking Metamucil without much response. She has occasional abdominal discomfort and cramps. She denies any blood in the stool. She denies any loss of appetite or loss of weight. Her symptoms are now mostly postprandial.    Review of Systems   All other systems reviewed and are negative. Past Medical History:   Diagnosis Date    Depression     DM2 (diabetes mellitus, type 2) ([address]     HTN (hypertension)        Past Surgical History:   Procedure Laterality Date    CATARACT REMOVAL Bilateral 2019     SECTION  1982/3/96    x3    [place] COLON CA SCRN NOT HI RSK IND N/A 2017    COLONOSCOPY performed by Fei Myrick MD polyp needs '22    TONSILLECTOMY  age 8     Current Outpatient Medications on File Prior to Visit   Medication Sig Dispense Refill    Saccharomyces boulardii (PROBIOTIC) 250 MG CAPS Take by mouth      dicyclomine (BENTYL) 10 MG capsule Take 1 capsule by mouth 4 times daily For loose stool. 120 capsule 3    Psyllium (METAMUCIL) 28.3 % POWD Mix 1 scoop into water 1-2 times daily.  538 g 1    atorvastatin (LIPITOR) 20 MG tablet Take 1 tablet by mouth daily 90 tablet 2    Insulin Degludec (TRESIBA FLEXTOUCH) 100 UNIT/ML SOPN 70 units at bedtime 90 day supply 90 pen 3    insulin lispro (HUMALOG KWIKPEN) 100 UNIT/ML pen 20 units am and 25-30 units lunch and dinner    90 day supply (Patient taking differently: 20 units am and 25 lunch and 30 dinner       90 day supply) 90 pen 3    triamterene-hydrochlorothiazide (MAXZIDE-25) 37.5-25 MG per tablet TAKE 1 TABLET DAILY 90 tablet 1    amitriptyline (ELAVIL) 10 MG tablet TAKE 1 TABLET NIGHTLY 90 tablet 1    pantoprazole (PROTONIX) 40 MG tablet TAKE 1 TABLET DAILY 90 tablet 1    citalopram (CELEXA) 20 MG tablet TAKE 1 TABLET DAILY 90 tablet 1    propranolol (INDERAL) 20 MG tablet TAKE 1 TABLET TWICE A  tablet 1    Insulin Pen Needle (NOVOFINE) 32G X 6 MM MISC qid 400 each 3    albuterol sulfate  (90 Base) MCG/ACT inhaler Inhale 1-2 puffs into the lungs every 4 hours as needed for Wheezing or Shortness of Breath 1 Inhaler 0    Insulin Pen Needle (B-D ULTRAFINE III SHORT PEN) 31G X 8 MM MISC 1 each by Does not apply route daily 100 each 3    B-D ULTRA FINE LANCETS MISC Test daily 100 each 1    Glucose Blood (BLOOD GLUCOSE TEST STRIPS) STRP Test bid and prn 100 strip 3    aspirin 81 MG tablet Take 81 mg by mouth daily      Melatonin 1 MG CAPS Take by mouth       No current facility-administered medications on file prior to visit. Family History   Problem Relation Age of Onset    Stroke Mother     Kidney Disease Mother     Heart Disease Father     Heart Disease Sister     Diabetes Sister     Heart Disease Brother         massive MI    Colon Cancer Neg Hx       Social History     Socioeconomic History    Marital status: Single     Spouse name: None    Number of children: None    Years of education: None    Highest education level: None   Occupational History    None   Social Needs    Financial resource strain: None    Food insecurity:     Worry: None     Inability: None    Transportation needs:     Medical: None     Non-medical: None   Tobacco Use    Smoking status: Never Smoker    Smokeless tobacco: Never Used   Substance and Sexual Activity    Alcohol use:  Yes     Alcohol/week: 0.0 standard drinks     Comment: occ    Drug use: No    Sexual activity: Yes   Lifestyle    Physical activity:     Days per week: None     Minutes per session: None    Stress: None   Relationships    Social connections:     Talks on phone: None     Gets together: None     Attends Judaism service: None     Active member of club or organization: None     Attends meetings of clubs or organizations: None     Relationship status: None    Intimate partner violence:     Fear of current or ex partner: None     Emotionally abused: None     Physically abused: None     Forced sexual activity: None   Other Topics Concern    None   Social History Narrative    None       Blood pressure 128/70, pulse 77, temperature 98 °F (36.7 °C), height 5' 2\" (1.575 m), weight 240 lb (108.9 kg), SpO2 98 %, not currently breastfeeding. Physical Exam  Constitutional:       General: She is not in acute distress. Appearance: Normal appearance. She is well-developed. Comments: Significant central obesity   Eyes:      General: No scleral icterus. Cardiovascular:      Rate and Rhythm: Normal rate and regular rhythm. Pulmonary:      Effort: Pulmonary effort is normal.      Breath sounds: Normal breath sounds. Abdominal:      General: Bowel sounds are normal. There is no distension. Palpations: Abdomen is soft. There is no mass. Tenderness: There is no tenderness. There is no guarding or rebound. Musculoskeletal: Normal range of motion. Lymphadenopathy:      Cervical: No cervical adenopathy. Neurological:      Mental Status: She is alert and oriented to person, place, and time. Psychiatric:         Behavior: Behavior normal.         Thought Content:  Thought content normal.         Judgment: Judgment normal.       Laboratory, Pathology, Radiology reviewed indetail with relevant important investigations summarized below:  Lab Results   Component Value Date    WBC 10.7 11/22/2016    HGB 14.7 11/22/2016    HCT 41.8 11/22/2016    MCV 83.0 11/22/2016     11/22/2016     Lab Results   Component Value Date    ALT 28 12/10/2019    AST

## 2020-01-24 ENCOUNTER — HOSPITAL ENCOUNTER (OUTPATIENT)
Dept: LAB | Age: 55
Discharge: HOME | End: 2020-01-24
Payer: COMMERCIAL

## 2020-01-24 LAB
ANION GAP SERPL CALCULATED.3IONS-SCNC: 15 MEQ/L (ref 9–15)
BUN BLDV-MCNC: 19 MG/DL (ref 6–20)
CALCIUM SERPL-MCNC: 9.1 MG/DL (ref 8.5–9.9)
CHLORIDE BLD-SCNC: 96 MEQ/L (ref 95–107)
CO2: 26 MEQ/L (ref 20–31)
CREAT SERPL-MCNC: 0.88 MG/DL (ref 0.5–0.9)
GFR AFRICAN AMERICAN: >60
GFR NON-AFRICAN AMERICAN: >60
GLUCOSE BLD-MCNC: 185 MG/DL (ref 70–99)
HBA1C MFR BLD: 7.4 % (ref 4.8–5.9)
POTASSIUM SERPL-SCNC: 3.5 MEQ/L (ref 3.4–4.9)
SODIUM BLD-SCNC: 137 MEQ/L (ref 135–144)

## 2020-01-24 PROCEDURE — 83036 HEMOGLOBIN GLYCOSYLATED A1C: CPT

## 2020-01-24 PROCEDURE — 80048 BASIC METABOLIC PNL TOTAL CA: CPT

## 2020-01-24 PROCEDURE — 36415 COLL VENOUS BLD VENIPUNCTURE: CPT

## 2020-01-28 ENCOUNTER — OFFICE VISIT (OUTPATIENT)
Dept: ENDOCRINOLOGY | Age: 55
End: 2020-01-28
Payer: COMMERCIAL

## 2020-01-28 VITALS
WEIGHT: 237 LBS | HEIGHT: 62 IN | SYSTOLIC BLOOD PRESSURE: 150 MMHG | HEART RATE: 76 BPM | BODY MASS INDEX: 43.61 KG/M2 | DIASTOLIC BLOOD PRESSURE: 80 MMHG

## 2020-01-28 LAB
CHP ED QC CHECK: NORMAL
GLUCOSE BLD-MCNC: 136 MG/DL

## 2020-01-28 PROCEDURE — 1036F TOBACCO NON-USER: CPT | Performed by: INTERNAL MEDICINE

## 2020-01-28 PROCEDURE — 2022F DILAT RTA XM EVC RTNOPTHY: CPT | Performed by: INTERNAL MEDICINE

## 2020-01-28 PROCEDURE — G8427 DOCREV CUR MEDS BY ELIG CLIN: HCPCS | Performed by: INTERNAL MEDICINE

## 2020-01-28 PROCEDURE — G8417 CALC BMI ABV UP PARAM F/U: HCPCS | Performed by: INTERNAL MEDICINE

## 2020-01-28 PROCEDURE — 3017F COLORECTAL CA SCREEN DOC REV: CPT | Performed by: INTERNAL MEDICINE

## 2020-01-28 PROCEDURE — 82962 GLUCOSE BLOOD TEST: CPT | Performed by: INTERNAL MEDICINE

## 2020-01-28 PROCEDURE — 99213 OFFICE O/P EST LOW 20 MIN: CPT | Performed by: INTERNAL MEDICINE

## 2020-01-28 PROCEDURE — G8484 FLU IMMUNIZE NO ADMIN: HCPCS | Performed by: INTERNAL MEDICINE

## 2020-02-04 ASSESSMENT — ENCOUNTER SYMPTOMS: DIARRHEA: 1

## 2020-02-04 NOTE — PROGRESS NOTES
per tablet, Take 1 tablet by mouth 4 times daily as needed for Diarrhea for up to 30 days. , Disp: 30 tablet, Rfl: 1    Saccharomyces boulardii (PROBIOTIC) 250 MG CAPS, Take by mouth, Disp: , Rfl:     dicyclomine (BENTYL) 10 MG capsule, Take 1 capsule by mouth 4 times daily For loose stool., Disp: 120 capsule, Rfl: 3    Psyllium (METAMUCIL) 28.3 % POWD, Mix 1 scoop into water 1-2 times daily. , Disp: 538 g, Rfl: 1    atorvastatin (LIPITOR) 20 MG tablet, Take 1 tablet by mouth daily, Disp: 90 tablet, Rfl: 2    Insulin Degludec (TRESIBA FLEXTOUCH) 100 UNIT/ML SOPN, 70 units at bedtime 90 day supply, Disp: 90 pen, Rfl: 3    insulin lispro (HUMALOG KWIKPEN) 100 UNIT/ML pen, 20 units am and 25-30 units lunch and dinner    90 day supply (Patient taking differently: 20 units am and 25 lunch and 30 dinner       90 day supply), Disp: 90 pen, Rfl: 3    triamterene-hydrochlorothiazide (MAXZIDE-25) 37.5-25 MG per tablet, TAKE 1 TABLET DAILY, Disp: 90 tablet, Rfl: 1    amitriptyline (ELAVIL) 10 MG tablet, TAKE 1 TABLET NIGHTLY, Disp: 90 tablet, Rfl: 1    pantoprazole (PROTONIX) 40 MG tablet, TAKE 1 TABLET DAILY, Disp: 90 tablet, Rfl: 1    citalopram (CELEXA) 20 MG tablet, TAKE 1 TABLET DAILY, Disp: 90 tablet, Rfl: 1    propranolol (INDERAL) 20 MG tablet, TAKE 1 TABLET TWICE A DAY, Disp: 180 tablet, Rfl: 1    Insulin Pen Needle (NOVOFINE) 32G X 6 MM MISC, qid, Disp: 400 each, Rfl: 3    albuterol sulfate  (90 Base) MCG/ACT inhaler, Inhale 1-2 puffs into the lungs every 4 hours as needed for Wheezing or Shortness of Breath, Disp: 1 Inhaler, Rfl: 0    Insulin Pen Needle (B-D ULTRAFINE III SHORT PEN) 31G X 8 MM MISC, 1 each by Does not apply route daily, Disp: 100 each, Rfl: 3    B-D ULTRA FINE LANCETS MISC, Test daily, Disp: 100 each, Rfl: 1    Glucose Blood (BLOOD GLUCOSE TEST STRIPS) STRP, Test bid and prn, Disp: 100 strip, Rfl: 3    aspirin 81 MG tablet, Take 81 mg by mouth daily, Disp: , Rfl:     Melatonin

## 2020-03-04 ENCOUNTER — OFFICE VISIT (OUTPATIENT)
Dept: CARDIOLOGY CLINIC | Age: 55
End: 2020-03-04
Payer: COMMERCIAL

## 2020-03-04 VITALS
RESPIRATION RATE: 18 BRPM | HEART RATE: 81 BPM | BODY MASS INDEX: 43.71 KG/M2 | DIASTOLIC BLOOD PRESSURE: 78 MMHG | OXYGEN SATURATION: 98 % | WEIGHT: 239 LBS | SYSTOLIC BLOOD PRESSURE: 138 MMHG

## 2020-03-04 PROCEDURE — G8417 CALC BMI ABV UP PARAM F/U: HCPCS | Performed by: INTERNAL MEDICINE

## 2020-03-04 PROCEDURE — G8427 DOCREV CUR MEDS BY ELIG CLIN: HCPCS | Performed by: INTERNAL MEDICINE

## 2020-03-04 PROCEDURE — G8484 FLU IMMUNIZE NO ADMIN: HCPCS | Performed by: INTERNAL MEDICINE

## 2020-03-04 PROCEDURE — 1036F TOBACCO NON-USER: CPT | Performed by: INTERNAL MEDICINE

## 2020-03-04 PROCEDURE — 3017F COLORECTAL CA SCREEN DOC REV: CPT | Performed by: INTERNAL MEDICINE

## 2020-03-04 PROCEDURE — 99213 OFFICE O/P EST LOW 20 MIN: CPT | Performed by: INTERNAL MEDICINE

## 2020-03-04 NOTE — PROGRESS NOTES
[facility] CARDIOLOGY OFFICE FOLLOW-UP      Patient: Anthony Henry  YOB: 1965  MRN: [id number]    Chief Complaint:  Chief Complaint   Patient presents with    6 Month Follow-Up    Hypertension    Hyperlipidemia       Subjective/HPI    The patient is followed in the cardiology office chronically for the following problems: Chest pain, HTN, HPL    The last encounter focused on the following: initial assessment    Testing/hospitalizations/procedures performed since last encounter: none    Since the last encounter, the patient has not had new symptoms/events. Past Medical History:   Diagnosis Date    Depression     DM2 (diabetes mellitus, type 2) ([facility] [address]     HTN (hypertension)        Past Surgical History:   Procedure Laterality Date    CATARACT REMOVAL Bilateral 2019     SECTION  1982/3/96    x3    [place] COLON CA SCRN NOT HI RSK IND N/A 2017    COLONOSCOPY performed by Damián Brian MD polyp needs '25    TONSILLECTOMY  age 8       Family History   Problem Relation Age of Onset    Stroke Mother     Kidney Disease Mother     Heart Disease Father     Heart Disease Sister     Diabetes Sister     Heart Disease Brother         massive MI    Colon Cancer Neg Hx        Social History     Socioeconomic History    Marital status: Single     Spouse name: None    Number of children: None    Years of education: None    Highest education level: None   Occupational History    None   Social Needs    Financial resource strain: None    Food insecurity:     Worry: None     Inability: None    Transportation needs:     Medical: None     Non-medical: None   Tobacco Use    Smoking status: Never Smoker    Smokeless tobacco: Never Used   Substance and Sexual Activity    Alcohol use:  Yes     Alcohol/week: 0.0 standard drinks     Comment: occ    Drug use: No    Sexual activity: Yes   Lifestyle    Physical activity:     Days per week: None     Minutes per session: None    Neurological: The patient is alert and oriented to person, place, and time. Intact cranial nerves. Skin: Skin is warm and dry. No rash noted.        LABS:  CBC:   Lab Results   Component Value Date    WBC 10.7 11/22/2016    RBC 5.04 11/22/2016    HGB 14.7 11/22/2016    HCT 41.8 11/22/2016    MCV 83.0 11/22/2016    MCH 29.3 11/22/2016    MCHC 35.3 11/22/2016    RDW 12.8 11/22/2016     11/22/2016     Lipids:  Lab Results   Component Value Date    CHOL 164 03/01/2019    CHOL 160 07/09/2018    CHOL 157 06/05/2017     Lab Results   Component Value Date    TRIG 175 (H) 03/01/2019    TRIG 133 07/09/2018    TRIG 167 06/05/2017     Lab Results   Component Value Date    HDL 38 (L) 03/01/2019    HDL 40 07/09/2018    HDL 38 (L) 06/05/2017     Lab Results   Component Value Date    LDLCALC 91 03/01/2019    LDLCALC 93 07/09/2018    LDLCALC 86 06/05/2017     No results found for: LABVLDL, VLDL  No results found for: CHOLHDLRATIO  CMP:    Lab Results   Component Value Date     01/24/2020    K 3.5 01/24/2020    CL 96 01/24/2020    CO2 26 01/24/2020    BUN 19 01/24/2020    CREATININE 0.88 01/24/2020    GFRAA >60.0 01/24/2020    LABGLOM >60.0 01/24/2020    GLUCOSE 136 01/28/2020    PROT 6.8 12/10/2019    LABALBU 4.1 12/10/2019    CALCIUM 9.1 01/24/2020    BILITOT 0.5 12/10/2019    ALKPHOS 108 12/10/2019    AST 23 12/10/2019    ALT 28 12/10/2019     BMP:    Lab Results   Component Value Date     01/24/2020    K 3.5 01/24/2020    CL 96 01/24/2020    CO2 26 01/24/2020    BUN 19 01/24/2020    LABALBU 4.1 12/10/2019    CREATININE 0.88 01/24/2020    CALCIUM 9.1 01/24/2020    GFRAA >60.0 01/24/2020    LABGLOM >60.0 01/24/2020    GLUCOSE 136 01/28/2020     Magnesium:  No results found for: MG  TSH:  Lab Results   Component Value Date    TSH 1.830 01/31/2017       Patient Active Problem List   Diagnosis    Depression    Essential hypertension    Family history of early CAD    Type 2 diabetes mellitus treated with

## 2020-04-23 RX ORDER — PEN NEEDLE, DIABETIC 32 GX 1/4"
NEEDLE, DISPOSABLE MISCELLANEOUS
Qty: 400 EACH | Refills: 3 | Status: SHIPPED | OUTPATIENT
Start: 2020-04-23

## 2020-04-24 ENCOUNTER — HOSPITAL ENCOUNTER (OUTPATIENT)
Dept: LAB | Age: 55
Discharge: HOME | End: 2020-04-24
Payer: COMMERCIAL

## 2020-04-24 LAB
ANION GAP SERPL CALCULATED.3IONS-SCNC: 15 MEQ/L (ref 9–15)
BUN BLDV-MCNC: 14 MG/DL (ref 6–20)
CALCIUM SERPL-MCNC: 9.5 MG/DL (ref 8.5–9.9)
CHLORIDE BLD-SCNC: 97 MEQ/L (ref 95–107)
CO2: 29 MEQ/L (ref 20–31)
CREAT SERPL-MCNC: 0.84 MG/DL (ref 0.5–0.9)
CREATININE URINE: 87.3 MG/DL
GFR AFRICAN AMERICAN: >60
GFR NON-AFRICAN AMERICAN: >60
GLUCOSE BLD-MCNC: 97 MG/DL (ref 70–99)
HBA1C MFR BLD: 6.8 % (ref 4.8–5.9)
MICROALBUMIN UR-MCNC: <1.2 MG/DL
MICROALBUMIN/CREAT UR-RTO: NORMAL MG/G (ref 0–30)
POTASSIUM SERPL-SCNC: 3.8 MEQ/L (ref 3.4–4.9)
SODIUM BLD-SCNC: 141 MEQ/L (ref 135–144)

## 2020-04-24 PROCEDURE — 80048 BASIC METABOLIC PNL TOTAL CA: CPT

## 2020-04-24 PROCEDURE — 82570 ASSAY OF URINE CREATININE: CPT

## 2020-04-24 PROCEDURE — 36415 COLL VENOUS BLD VENIPUNCTURE: CPT

## 2020-04-24 PROCEDURE — 82043 UR ALBUMIN QUANTITATIVE: CPT

## 2020-04-24 PROCEDURE — 83036 HEMOGLOBIN GLYCOSYLATED A1C: CPT

## 2020-04-28 ENCOUNTER — VIRTUAL VISIT (OUTPATIENT)
Dept: ENDOCRINOLOGY | Age: 55
End: 2020-04-28
Payer: COMMERCIAL

## 2020-04-28 PROCEDURE — 3044F HG A1C LEVEL LT 7.0%: CPT | Performed by: INTERNAL MEDICINE

## 2020-04-28 PROCEDURE — 99213 OFFICE O/P EST LOW 20 MIN: CPT | Performed by: INTERNAL MEDICINE

## 2020-04-28 PROCEDURE — 2022F DILAT RTA XM EVC RTNOPTHY: CPT | Performed by: INTERNAL MEDICINE

## 2020-04-28 PROCEDURE — 3017F COLORECTAL CA SCREEN DOC REV: CPT | Performed by: INTERNAL MEDICINE

## 2020-04-28 PROCEDURE — G8428 CUR MEDS NOT DOCUMENT: HCPCS | Performed by: INTERNAL MEDICINE

## 2020-04-28 NOTE — PROGRESS NOTES
2020    TELEHEALTH EVALUATION -- Audio/Visual (During UYLRR-67 public health emergency)    Due to COVID 19 outbreak, patient's office visit was converted to a virtual visit. Patient was contacted and agreed to proceed with a virtual visit via [email or web address] me  The risks and benefits of converting to a virtual visit were discussed in light of the current infectious disease epidemic. Patient also understood that insurance coverage and co-pays are up to their individual insurance plans. HPI: Patient made aware this is a video visit billable visit agreed to proceed patient was at home I was at my office    Henry Gould (:  1965) has requested an audio/video evaluation for the following concern(s):    Type 2 diabetes improving control on treshiba 70 units hs plus novolog 20-25 units with each melas control improved overall over 3 months denies hypoglycemia testing 3 times/day     Results for Elizabeth Reddy (MRN [id number]) as of 2020 13:31   Ref. Range 2020 11:52   Sodium Latest Ref Range: 135 - 144 mEq/L 141   Potassium Latest Ref Range: 3.4 - 4.9 mEq/L 3.8   Chloride Latest Ref Range: 95 - 107 mEq/L 97   CO2 Latest Ref Range: 20 - 31 mEq/L 29   BUN Latest Ref Range: 6 - 20 mg/dL 14   Creatinine Latest Ref Range: 0.50 - 0.90 mg/dL 0.84   Anion Gap Latest Ref Range: 9 - 15 mEq/L 15   GFR Non- Latest Ref Range: >60  >60.0   GFR African American Latest Ref Range: >60  >60.0   Glucose Latest Ref Range: 70 - 99 mg/dL 97   Calcium Latest Ref Range: 8.5 - 9.9 mg/dL 9.5   Hemoglobin A1C Latest Ref Range: 4.8 - 5.9 % 6.8 (H)   Creatinine, Ur Latest Ref Range: Not Established mg/dL 87.3   Microalbumin Creatinine Ratio Latest Ref Range: 0.0 - 30.0 mg/G see below   Microalbumin, Random Urine Latest Ref Range: Not Established mg/dL <1.20       Results for Elizabeth Reddy (MRN [id number]) as of 2020 06:20   Ref.  Range 2019 09:28 2019 08:04 10/23/2019 08:23 2020 07:44 2020 Glucose Blood (BLOOD GLUCOSE TEST STRIPS) STRP Test bid and prn  Dona Gomez MD   aspirin 81 MG tablet Take 81 mg by mouth daily  Historical Provider, MD   Melatonin 1 MG CAPS Take by mouth  Historical Provider, MD       Social History     Tobacco Use    Smoking status: Never Smoker    Smokeless tobacco: Never Used   Substance Use Topics    Alcohol use: Yes     Alcohol/week: 0.0 standard drinks     Comment: occ    Drug use: No            PHYSICAL EXAMINATION:  [ INSTRUCTIONS:  \"[x]\" Indicates a positive item  \"[]\" Indicates a negative item  -- DELETE ALL ITEMS NOT EXAMINED]  [] Alert  [] Oriented to person/place/time    [] No apparent distress  [] Toxic appearing    [] Face flushed appearing [] Sclera clear  [] Lips are cyanotic      [] Breathing appears normal  [] Appears tachypneic      [] Rash on visible skin    [] Cranial Nerves II-XII grossly intact    [] Motor grossly intact in visible upper extremities    [] Motor grossly intact in visible lower extremities    [] Normal Mood  [] Anxious appearing    [] Depressed appearing  [] Confused appearing      [] Poor short term memory  [] Poor long term memory    [] OTHER:      Due to this being a TeleHealth encounter, evaluation of the following organ systems is limited: Vitals/Constitutional/EENT/Resp/CV/GI//MS/Neuro/Skin/Heme-Lymph-Imm. ASSESSMENT/PLAN:     Diagnosis Orders   1. Type 2 diabetes mellitus treated with insulin ([facility]         Orders Placed This Encounter   Procedures    Basic Metabolic Panel     Standing Status:   Future     Standing Expiration Date:   5/4/2021    Hemoglobin A1C     Standing Status:   Future     Standing Expiration Date:   5/4/2021    Microalbumin / Creatinine Urine Ratio     Standing Status:   Future     Standing Expiration Date:   5/4/2021     Continue current dose of treshiba and novolog     Time spend was 16 min         An  electronic signature was used to authenticate this note.     --Yelena Peguero MD on 4/28/2020 at 1:31 PM        Pursuant to the emergency declaration under the [facility], [facility] waiver authority and the [employer] Act, this Virtual  Visit was conducted, with patient's consent, to reduce the patient's risk of exposure to COVID-19 and provide continuity of care for an established patient. Services were provided through a video synchronous discussion virtually to substitute for in-person clinic visit.

## 2020-05-22 ENCOUNTER — VIRTUAL VISIT (OUTPATIENT)
Dept: FAMILY MEDICINE CLINIC | Age: 55
End: 2020-05-22
Payer: COMMERCIAL

## 2020-05-22 PROBLEM — R19.5 LOOSE STOOLS: Status: RESOLVED | Noted: 2020-01-20 | Resolved: 2020-05-22

## 2020-05-22 PROCEDURE — G8428 CUR MEDS NOT DOCUMENT: HCPCS | Performed by: PHYSICIAN ASSISTANT

## 2020-05-22 PROCEDURE — 3044F HG A1C LEVEL LT 7.0%: CPT | Performed by: PHYSICIAN ASSISTANT

## 2020-05-22 PROCEDURE — 99214 OFFICE O/P EST MOD 30 MIN: CPT | Performed by: PHYSICIAN ASSISTANT

## 2020-05-22 PROCEDURE — 2022F DILAT RTA XM EVC RTNOPTHY: CPT | Performed by: PHYSICIAN ASSISTANT

## 2020-05-22 PROCEDURE — 3017F COLORECTAL CA SCREEN DOC REV: CPT | Performed by: PHYSICIAN ASSISTANT

## 2020-05-22 ASSESSMENT — PATIENT HEALTH QUESTIONNAIRE - PHQ9
SUM OF ALL RESPONSES TO PHQ QUESTIONS 1-9: 0
1. LITTLE INTEREST OR PLEASURE IN DOING THINGS: 0
SUM OF ALL RESPONSES TO PHQ9 QUESTIONS 1 & 2: 0
SUM OF ALL RESPONSES TO PHQ QUESTIONS 1-9: 0
2. FEELING DOWN, DEPRESSED OR HOPELESS: 0

## 2020-05-22 ASSESSMENT — ENCOUNTER SYMPTOMS
RECTAL PAIN: 0
COLOR CHANGE: 0
NAUSEA: 0
DIARRHEA: 0
WHEEZING: 0
SHORTNESS OF BREATH: 0
ABDOMINAL DISTENTION: 0
ANAL BLEEDING: 0
CHEST TIGHTNESS: 0
COUGH: 0

## 2020-05-22 NOTE — PROGRESS NOTES
MM MISC 1 each by Does not apply route daily  MD HARRISON Bloom ULTRA FINE LANCETS MISC Test daily  Swapnil Mccurdy MD   Glucose Blood (BLOOD GLUCOSE TEST STRIPS) STRP Test bid and prn  Swapnil Mccurdy MD   aspirin 81 MG tablet Take 81 mg by mouth daily  Historical Provider, MD   Melatonin 1 MG CAPS Take by mouth  Historical Provider, MD       Social History     Tobacco Use    Smoking status: Never Smoker    Smokeless tobacco: Never Used   Substance Use Topics    Alcohol use: Yes     Alcohol/week: 0.0 standard drinks     Comment: occ    Drug use: No      Allergies   Allergen Reactions    Metformin And Related      gi    Sesame Seed (Diagnostic)     Bactrim [Sulfamethoxazole-Trimethoprim] Rash   ,   Past Medical History:   Diagnosis Date    Depression     DM2 (diabetes mellitus, type 2) (HCC)     HTN (hypertension)    ,   Past Surgical History:   Procedure Laterality Date    CATARACT REMOVAL Bilateral 2019     SECTION  1982/3/96    x3    AL COLON CA SCRN NOT HI RSK IND N/A 2017    COLONOSCOPY performed by Keon Escobar MD polyp needs '25    TONSILLECTOMY  age 8   ,   Social History     Tobacco Use    Smoking status: Never Smoker    Smokeless tobacco: Never Used   Substance Use Topics    Alcohol use:  Yes     Alcohol/week: 0.0 standard drinks     Comment: occ    Drug use: No   ,   Family History   Problem Relation Age of Onset    Stroke Mother     Kidney Disease Mother     Heart Disease Father     Heart Disease Sister     Diabetes Sister     Heart Disease Brother         massive MI    Colon Cancer Neg Hx    ,   There is no immunization history on file for this patient.,   Health Maintenance   Topic Date Due    Hepatitis C screen  1965    Diabetic retinal exam  1973    HIV screen  1978    Hepatitis B vaccine (1 of 3 - Risk 3-dose series) 1982    DTaP/Tdap/Td vaccine (1 - Tdap) 1982    Shingles Vaccine (1 of 2)

## 2020-06-02 ENCOUNTER — HOSPITAL ENCOUNTER (OUTPATIENT)
Dept: WOMENS IMAGING | Age: 55
Discharge: HOME | End: 2020-06-04
Payer: COMMERCIAL

## 2020-06-02 PROCEDURE — 77067 SCR MAMMO BI INCL CAD: CPT

## 2020-06-03 ENCOUNTER — TELEPHONE (OUTPATIENT)
Dept: FAMILY MEDICINE CLINIC | Age: 55
End: 2020-06-03

## 2020-06-18 RX ORDER — INSULIN DEGLUDEC INJECTION 100 U/ML
INJECTION, SOLUTION SUBCUTANEOUS
Qty: 270 ML | Refills: 3 | Status: SHIPPED | OUTPATIENT
Start: 2020-06-18 | End: 2020-10-09 | Stop reason: ALTCHOICE

## 2020-07-23 ENCOUNTER — HOSPITAL ENCOUNTER (OUTPATIENT)
Dept: WOMENS IMAGING | Age: 55
Discharge: HOME | End: 2020-07-25
Payer: COMMERCIAL

## 2020-07-23 ENCOUNTER — APPOINTMENT (OUTPATIENT)
Dept: ULTRASOUND IMAGING | Age: 55
End: 2020-07-23
Payer: COMMERCIAL

## 2020-07-23 PROCEDURE — G0279 TOMOSYNTHESIS, MAMMO: HCPCS

## 2020-07-24 ENCOUNTER — HOSPITAL ENCOUNTER (OUTPATIENT)
Dept: LAB | Age: 55
Discharge: HOME | End: 2020-07-24
Payer: COMMERCIAL

## 2020-07-24 LAB
ALBUMIN SERPL-MCNC: 4.1 G/DL (ref 3.5–4.6)
ALP BLD-CCNC: 93 U/L (ref 40–130)
ALT SERPL-CCNC: 25 U/L (ref 0–33)
ANION GAP SERPL CALCULATED.3IONS-SCNC: 13 MEQ/L (ref 9–15)
AST SERPL-CCNC: 20 U/L (ref 0–35)
BILIRUB SERPL-MCNC: 0.6 MG/DL (ref 0.2–0.7)
BILIRUBIN DIRECT: <0.2 MG/DL (ref 0–0.4)
BILIRUBIN, INDIRECT: NORMAL MG/DL (ref 0–0.6)
BUN BLDV-MCNC: 19 MG/DL (ref 6–20)
CALCIUM SERPL-MCNC: 9.4 MG/DL (ref 8.5–9.9)
CHLORIDE BLD-SCNC: 98 MEQ/L (ref 95–107)
CHOLESTEROL, TOTAL: 116 MG/DL (ref 0–199)
CO2: 28 MEQ/L (ref 20–31)
CREAT SERPL-MCNC: 0.86 MG/DL (ref 0.5–0.9)
CREATININE URINE: 64.5 MG/DL
GFR AFRICAN AMERICAN: >60
GFR NON-AFRICAN AMERICAN: >60
GLUCOSE BLD-MCNC: 186 MG/DL (ref 70–99)
HBA1C MFR BLD: 6.9 % (ref 4.8–5.9)
HDLC SERPL-MCNC: 36 MG/DL (ref 40–59)
LDL CHOLESTEROL CALCULATED: 53 MG/DL (ref 0–129)
MICROALBUMIN UR-MCNC: <1.2 MG/DL
MICROALBUMIN/CREAT UR-RTO: NORMAL MG/G (ref 0–30)
POTASSIUM SERPL-SCNC: 3.9 MEQ/L (ref 3.4–4.9)
SODIUM BLD-SCNC: 139 MEQ/L (ref 135–144)
TOTAL PROTEIN: 6.8 G/DL (ref 6.3–8)
TRIGL SERPL-MCNC: 136 MG/DL (ref 0–150)

## 2020-07-24 PROCEDURE — 36415 COLL VENOUS BLD VENIPUNCTURE: CPT

## 2020-07-24 PROCEDURE — 80061 LIPID PANEL: CPT

## 2020-07-24 PROCEDURE — 83036 HEMOGLOBIN GLYCOSYLATED A1C: CPT

## 2020-07-24 PROCEDURE — 80048 BASIC METABOLIC PNL TOTAL CA: CPT

## 2020-07-24 PROCEDURE — 82043 UR ALBUMIN QUANTITATIVE: CPT

## 2020-07-24 PROCEDURE — 82570 ASSAY OF URINE CREATININE: CPT

## 2020-07-24 PROCEDURE — 80076 HEPATIC FUNCTION PANEL: CPT

## 2020-07-29 ENCOUNTER — OFFICE VISIT (OUTPATIENT)
Dept: ENDOCRINOLOGY | Age: 55
End: 2020-07-29
Payer: COMMERCIAL

## 2020-07-29 VITALS
DIASTOLIC BLOOD PRESSURE: 80 MMHG | BODY MASS INDEX: 41.7 KG/M2 | HEART RATE: 75 BPM | OXYGEN SATURATION: 95 % | WEIGHT: 228 LBS | SYSTOLIC BLOOD PRESSURE: 126 MMHG

## 2020-07-29 LAB
CHP ED QC CHECK: NORMAL
GLUCOSE BLD-MCNC: 106 MG/DL

## 2020-07-29 PROCEDURE — 99213 OFFICE O/P EST LOW 20 MIN: CPT | Performed by: INTERNAL MEDICINE

## 2020-07-29 PROCEDURE — G8427 DOCREV CUR MEDS BY ELIG CLIN: HCPCS | Performed by: INTERNAL MEDICINE

## 2020-07-29 PROCEDURE — 3017F COLORECTAL CA SCREEN DOC REV: CPT | Performed by: INTERNAL MEDICINE

## 2020-07-29 PROCEDURE — 1036F TOBACCO NON-USER: CPT | Performed by: INTERNAL MEDICINE

## 2020-07-29 PROCEDURE — 2022F DILAT RTA XM EVC RTNOPTHY: CPT | Performed by: INTERNAL MEDICINE

## 2020-07-29 PROCEDURE — 82962 GLUCOSE BLOOD TEST: CPT | Performed by: INTERNAL MEDICINE

## 2020-07-29 PROCEDURE — 3044F HG A1C LEVEL LT 7.0%: CPT | Performed by: INTERNAL MEDICINE

## 2020-07-29 PROCEDURE — G8417 CALC BMI ABV UP PARAM F/U: HCPCS | Performed by: INTERNAL MEDICINE

## 2020-07-29 NOTE — PROGRESS NOTES
Bilirubin, Indirect Latest Ref Range: 0.0 - 0.6 mg/dL  see below    Hemoglobin A1C Latest Ref Range: 4.8 - 5.9 % 6.9 (H)     Creatinine, Ur Latest Ref Range: Not Established mg/dL 64.5     Microalbumin Creatinine Ratio Latest Ref Range: 0.0 - 30.0 mg/G see below     Microalbumin, Random Urine Latest Ref Range: Not Established mg/dL <1.20       Patient Active Problem List   Diagnosis    Depression    Essential hypertension    Family history of early CAD    [facility]    BMI 40.0-44.9, adult ([facility])    Mixed hyperlipidemia    Irritable bowel syndrome with diarrhea     Allergies   Allergen Reactions    Metformin And Related      gi    Sesame Seed (Diagnostic)     Bactrim [Sulfamethoxazole-Trimethoprim] Rash       Current Outpatient Medications:     Insulin Degludec (TRESIBA FLEXTOUCH) 100 UNIT/ML SOPN, INJECT 60 UNITS UNDER THE SKIN AT BEDTIME, Disp: 270 mL, Rfl: 3    atorvastatin (LIPITOR) 20 MG tablet, TAKE 1 TABLET DAILY, Disp: 90 tablet, Rfl: 2    insulin lispro (HUMALOG) 100 UNIT/ML pen, 20 units am and 25-30 units lunch and dinner    90 day supply, Disp: 90 pen, Rfl: 3    Insulin Pen Needle (BD PEN NEEDLE MICRO U/F) 32G X 6 MM MISC, USE FOUR TIMES A DAY, Disp: 400 each, Rfl: 3    amitriptyline (ELAVIL) 10 MG tablet, TAKE 1 TABLET NIGHTLY, Disp: 90 tablet, Rfl: 1    citalopram (CELEXA) 20 MG tablet, TAKE 1 TABLET DAILY, Disp: 90 tablet, Rfl: 1    pantoprazole (PROTONIX) 40 MG tablet, TAKE 1 TABLET DAILY, Disp: 90 tablet, Rfl: 1    propranolol (INDERAL) 20 MG tablet, TAKE 1 TABLET TWICE A DAY, Disp: 180 tablet, Rfl: 1    triamterene-hydrochlorothiazide (MAXZIDE-25) 37.5-25 MG per tablet, TAKE 1 TABLET DAILY, Disp: 90 tablet, Rfl: 1    Probiotic Product (VSL#3 DS) PACK, TAKE ONE DAILY, Disp: 30 each, Rfl: 0    Saccharomyces boulardii (PROBIOTIC) 250 MG CAPS, Take by mouth, Disp: , Rfl:     albuterol sulfate  (90 Base) MCG/ACT inhaler, Inhale 1-2 puffs into the lungs every 4 hours as needed for Wheezing or Shortness of Breath, Disp: 1 Inhaler, Rfl: 0    Insulin Pen Needle (B-D ULTRAFINE III SHORT PEN) 31G X 8 MM MISC, 1 each by Does not apply route daily, Disp: 100 each, Rfl: 3    B-D ULTRA FINE LANCETS MISC, Test daily, Disp: 100 each, Rfl: 1    Glucose Blood (BLOOD GLUCOSE TEST STRIPS) STRP, Test bid and prn, Disp: 100 strip, Rfl: 3    aspirin 81 MG tablet, Take 81 mg by mouth daily, Disp: , Rfl:     Melatonin 1 MG CAPS, Take by mouth, Disp: , Rfl:       Review of Systems    Vitals:    07/29/20 1600   BP: 126/80   Pulse: 75   SpO2: 95%   Weight: 228 lb (103.4 kg)       Objective:   Physical Exam  Constitutional:       Appearance: Normal appearance. She is obese. HENT:      Head: Normocephalic and atraumatic. Neck:      Musculoskeletal: Normal range of motion and neck supple. Cardiovascular:      Rate and Rhythm: Normal rate. Musculoskeletal: Normal range of motion. Neurological:      Mental Status: She is alert. Psychiatric:         Mood and Affect: Mood normal.         Assessment:       Diagnosis Orders   1.  Type 2 diabetes mellitus treated with insulin ([facility])  POCT Glucose           Plan:      Orders Placed This Encounter   Procedures    POCT Glucose     Orders Placed This Encounter   Procedures    Basic Metabolic Panel     Standing Status:   Future     Standing Expiration Date:   7/29/2021    Hemoglobin A1C     Standing Status:   Future     Standing Expiration Date:   7/29/2021    POCT Glucose     Continue Tresiba 60 units at bedtime Humalog 20 to 25 units with each meals follow-up in 3 to 6 months time        Anni Bonilla MD

## 2020-10-09 ENCOUNTER — OFFICE VISIT (OUTPATIENT)
Dept: FAMILY MEDICINE CLINIC | Age: 55
End: 2020-10-09
Payer: COMMERCIAL

## 2020-10-09 VITALS
HEART RATE: 66 BPM | TEMPERATURE: 96 F | WEIGHT: 231 LBS | SYSTOLIC BLOOD PRESSURE: 118 MMHG | RESPIRATION RATE: 18 BRPM | HEIGHT: 62 IN | OXYGEN SATURATION: 99 % | BODY MASS INDEX: 42.51 KG/M2 | DIASTOLIC BLOOD PRESSURE: 70 MMHG

## 2020-10-09 PROBLEM — K58.0 IRRITABLE BOWEL SYNDROME WITH DIARRHEA: Status: RESOLVED | Noted: 2020-01-20 | Resolved: 2020-10-09

## 2020-10-09 PROBLEM — G47.19 EXCESSIVE DAYTIME SLEEPINESS: Status: ACTIVE | Noted: 2020-10-09

## 2020-10-09 PROBLEM — R06.83 SNORING: Status: ACTIVE | Noted: 2020-10-09

## 2020-10-09 PROCEDURE — 2022F DILAT RTA XM EVC RTNOPTHY: CPT | Performed by: PHYSICIAN ASSISTANT

## 2020-10-09 PROCEDURE — 99214 OFFICE O/P EST MOD 30 MIN: CPT | Performed by: PHYSICIAN ASSISTANT

## 2020-10-09 PROCEDURE — G8484 FLU IMMUNIZE NO ADMIN: HCPCS | Performed by: PHYSICIAN ASSISTANT

## 2020-10-09 PROCEDURE — 3044F HG A1C LEVEL LT 7.0%: CPT | Performed by: PHYSICIAN ASSISTANT

## 2020-10-09 PROCEDURE — G8417 CALC BMI ABV UP PARAM F/U: HCPCS | Performed by: PHYSICIAN ASSISTANT

## 2020-10-09 PROCEDURE — G8427 DOCREV CUR MEDS BY ELIG CLIN: HCPCS | Performed by: PHYSICIAN ASSISTANT

## 2020-10-09 PROCEDURE — 3017F COLORECTAL CA SCREEN DOC REV: CPT | Performed by: PHYSICIAN ASSISTANT

## 2020-10-09 PROCEDURE — 1036F TOBACCO NON-USER: CPT | Performed by: PHYSICIAN ASSISTANT

## 2020-10-09 ASSESSMENT — ENCOUNTER SYMPTOMS
WHEEZING: 0
DIARRHEA: 0
CHEST TIGHTNESS: 0
COLOR CHANGE: 0
TROUBLE SWALLOWING: 0
NAUSEA: 0
ABDOMINAL DISTENTION: 0
SHORTNESS OF BREATH: 0
COUGH: 0
RECTAL PAIN: 0
BACK PAIN: 0
ANAL BLEEDING: 0

## 2020-10-09 NOTE — PROGRESS NOTES
[address] [facility], 60 y.o. female presents today with:  Chief Complaint   Patient presents with    Diabetes     4 month follow up       HPI  Last OV with me: 5/22/2020  Routine follow up with me today. Overall, feeling well. UTD with mammogram.       Excessive sleepiness, daytime. +snoring. Excessively sleepy during the day. Has never had a sleep study. Type 2 diabetes.    Routinely followed by endocrinology, Dr. Tammie Barber was on 7/29/2020.    HgbA1c of 6.9% (7/2020)  Current therapy: insulin pump, doing really well.    UTD with microalbumin. No hypo/hyperglycemic symptoms. She is on statin, 81 mg asa.      Diarrhea.    Has had no further episodes. No longer taking probiotic. Endoscopic investigations: Colonoscopy November 2017: Tubular adenoma     HTN. Doing well on medication. BP controlled. Denies CP, dizziness, SOB, CUELLAR, edema of LE.      Mixed hyperlipidemia. Due for repeat lipid. She is taking lipitor without myalgias or muscle weakness.      Anxiety and depression. Stable on current medication regimen. Denies worsening depressed mood/anxiety. Taking celexa 20 mg and elavil 10 mg.        Review of Systems   Constitutional: Negative for activity change, appetite change, diaphoresis, fatigue, fever and unexpected weight change. HENT: Negative for congestion, dental problem and trouble swallowing. Eyes: Negative for visual disturbance. Respiratory: Negative for cough, chest tightness, shortness of breath and wheezing. Cardiovascular: Negative for chest pain, palpitations and leg swelling. Gastrointestinal: Negative for abdominal distention, anal bleeding, diarrhea, nausea and rectal pain. Endocrine: Negative for polydipsia, polyphagia and polyuria. Musculoskeletal: Negative for arthralgias and back pain. Skin: Negative for color change. Neurological: Negative for dizziness, weakness, light-headedness, numbness and headaches. (104.8 kg)   Height: 5' 2\" (1.575 m)     BP Readings from Last 3 Encounters:   10/09/20 118/70   07/29/20 126/80   03/04/20 138/78     Wt Readings from Last 3 Encounters:   10/09/20 231 lb (104.8 kg)   07/29/20 228 lb (103.4 kg)   03/04/20 239 lb (108.4 kg)     Physical Exam  Vitals signs and nursing note reviewed. Constitutional:       General: She is not in acute distress. Appearance: She is well-developed. She is obese. She is not ill-appearing or diaphoretic. Comments: Sitting comfortably in exam room. NAD. HENT:      Head: Normocephalic and atraumatic. Right Ear: Tympanic membrane, ear canal and external ear normal.      Left Ear: Tympanic membrane, ear canal and external ear normal.      Nose: Nose normal.      Mouth/Throat:      Mouth: Mucous membranes are moist.   Eyes:      Conjunctiva/sclera: Conjunctivae normal.   Neck:      Musculoskeletal: Normal range of motion and neck supple. Cardiovascular:      Rate and Rhythm: Normal rate and regular rhythm. Heart sounds: Normal heart sounds. Pulmonary:      Effort: Pulmonary effort is normal. No respiratory distress. Breath sounds: Normal breath sounds. Musculoskeletal: Normal range of motion. Skin:     General: Skin is warm and dry. Neurological:      Mental Status: She is alert and oriented to person, place, and time. Psychiatric:         Attention and Perception: Attention normal.         Mood and Affect: Mood and affect normal.         Speech: Speech normal.         Behavior: Behavior normal.         Thought Content: Thought content normal.         Cognition and Memory: Cognition and memory normal.         Judgment: Judgment normal.      Comments: Doing very well. No complaints. Relationship of 5 years, works for [employer] her job. Likes to spend time with family. Assessment & [address] was seen today for diabetes.     Diagnoses and all orders for this visit:    Type 2 diabetes mellitus treated with insulin (HCC)    Excessive daytime sleepiness  -     Baseline Diagnostic Sleep Study; Future    Snoring  -     Baseline Diagnostic Sleep Study; Future    Essential hypertension    Depression, unspecified depression type    Mixed hyperlipidemia    [facility] 40.0-44.9[facility]    Sleep study for evaluation. 6 month follow up with me. Orders Placed This Encounter   Procedures    Baseline Diagnostic Sleep Study     Patients with abnormal results will be assessed and referred to the sleep  as needed. Standing Status:   Future     Standing Expiration Date:   10/9/2021     Scheduling Instructions:      Scheduling and Pre-Certification: [phone]      The following must be completed and FAXED to [phone]      1) Sleep Evaluation Orders Form      2) Office note justifying study      3) Demographic info / insurance card     Order Specific Question:   Adult or Pediatric     Answer:   Adult Study (>7 Years)     Order Specific Question:   Location For Sleep Study     Answer:   [facility]     Order Specific Question:   Select Sleep Lab Location     Answer:   [address] [facility]     No orders of the defined types were placed in this encounter. Medications Discontinued During This Encounter   Medication Reason    Insulin Degludec (TRESIBA FLEXTOUCH) 100 UNIT/ML SOPN Alternate therapy    insulin lispro (HUMALOG) 100 UNIT/ML pen Alternate therapy    Probiotic Product (VSL#3 DS) PACK Alternate therapy    Saccharomyces boulardii (PROBIOTIC) 250 MG CAPS Alternate therapy     Return in about 6 months (around 4/9/2021) for follow up in office. Reviewed with the patient: current clinical status, medications, activities and diet. Side effects, adverse effects of the medication prescribed today, as well as treatment plan/ rationale and result expectations have been discussed with the patient who expresses understanding and desires to proceed.     Close follow up to evaluate treatment results and for coordination of care. I have reviewed the patient's medical history in detail and updated the computerized patient record.     Farrah Rayo PA-C

## 2020-10-14 ENCOUNTER — HOSPITAL ENCOUNTER (OUTPATIENT)
Dept: SLEEP CENTER | Age: 55
Discharge: HOME | End: 2020-10-16
Payer: COMMERCIAL

## 2020-10-14 PROCEDURE — 95810 POLYSOM 6/> YRS 4/> PARAM: CPT

## 2020-10-16 PROCEDURE — 95810 POLYSOM 6/> YRS 4/> PARAM: CPT | Performed by: INTERNAL MEDICINE

## 2020-10-27 ENCOUNTER — HOSPITAL ENCOUNTER (OUTPATIENT)
Dept: LAB | Age: 55
Discharge: HOME | End: 2020-10-27
Payer: COMMERCIAL

## 2020-10-27 LAB
ANION GAP SERPL CALCULATED.3IONS-SCNC: 13 MEQ/L (ref 9–15)
BUN BLDV-MCNC: 19 MG/DL (ref 6–20)
CALCIUM SERPL-MCNC: 8.7 MG/DL (ref 8.5–9.9)
CHLORIDE BLD-SCNC: 98 MEQ/L (ref 95–107)
CO2: 27 MEQ/L (ref 20–31)
CREAT SERPL-MCNC: 0.89 MG/DL (ref 0.5–0.9)
GFR AFRICAN AMERICAN: >60
GFR NON-AFRICAN AMERICAN: >60
GLUCOSE BLD-MCNC: 228 MG/DL (ref 70–99)
HBA1C MFR BLD: 7.6 % (ref 4.8–5.9)
POTASSIUM SERPL-SCNC: 4.2 MEQ/L (ref 3.4–4.9)
SODIUM BLD-SCNC: 138 MEQ/L (ref 135–144)

## 2020-10-27 PROCEDURE — 83036 HEMOGLOBIN GLYCOSYLATED A1C: CPT

## 2020-10-27 PROCEDURE — 80048 BASIC METABOLIC PNL TOTAL CA: CPT

## 2020-10-27 PROCEDURE — 36415 COLL VENOUS BLD VENIPUNCTURE: CPT

## 2020-10-29 ENCOUNTER — OFFICE VISIT (OUTPATIENT)
Dept: ENDOCRINOLOGY | Age: 55
End: 2020-10-29
Payer: COMMERCIAL

## 2020-10-29 VITALS
OXYGEN SATURATION: 96 % | WEIGHT: 232 LBS | HEART RATE: 69 BPM | HEIGHT: 62 IN | BODY MASS INDEX: 42.69 KG/M2 | DIASTOLIC BLOOD PRESSURE: 75 MMHG | SYSTOLIC BLOOD PRESSURE: 117 MMHG

## 2020-10-29 PROCEDURE — 3051F HG A1C>EQUAL 7.0%<8.0%: CPT | Performed by: INTERNAL MEDICINE

## 2020-10-29 PROCEDURE — 99213 OFFICE O/P EST LOW 20 MIN: CPT | Performed by: INTERNAL MEDICINE

## 2020-10-29 PROCEDURE — 1036F TOBACCO NON-USER: CPT | Performed by: INTERNAL MEDICINE

## 2020-10-29 PROCEDURE — 3017F COLORECTAL CA SCREEN DOC REV: CPT | Performed by: INTERNAL MEDICINE

## 2020-10-29 PROCEDURE — G8427 DOCREV CUR MEDS BY ELIG CLIN: HCPCS | Performed by: INTERNAL MEDICINE

## 2020-10-29 PROCEDURE — 2022F DILAT RTA XM EVC RTNOPTHY: CPT | Performed by: INTERNAL MEDICINE

## 2020-10-29 PROCEDURE — G8484 FLU IMMUNIZE NO ADMIN: HCPCS | Performed by: INTERNAL MEDICINE

## 2020-10-29 PROCEDURE — G8417 CALC BMI ABV UP PARAM F/U: HCPCS | Performed by: INTERNAL MEDICINE

## 2020-10-29 NOTE — PROGRESS NOTES
Subjective:      Patient ID: Becca Gruber is a 60 y.o. female. Follow-up on type 2 diabetes recent insulin pump start end of August reviewed downloads patient does not have sensor CGM at this time  Diabetes   She presents for her follow-up diabetic visit. She has type 2 diabetes mellitus. Symptoms are improving. Risk factors for coronary artery disease include obesity. Current diabetic treatment includes insulin pump and insulin injections. Her overall blood glucose range is 140-180 mg/dl. (Lab Results       Component                Value               Date                       LABA1C                   7.6 (H)             10/27/2020              Reviewed 2-week CGM blood sugars are averaging between 159±30 no hypoglycemia)       Results for Miguel Jung (MRN [id number]) as of 10/29/2020 16:23   Ref.  Range 10/27/2020 08:19   Sodium Latest Ref Range: 135 - 144 mEq/L 138   Potassium Latest Ref Range: 3.4 - 4.9 mEq/L 4.2   Chloride Latest Ref Range: 95 - 107 mEq/L 98   CO2 Latest Ref Range: 20 - 31 mEq/L 27   BUN Latest Ref Range: 6 - 20 mg/dL 19   Creatinine Latest Ref Range: 0.50 - 0.90 mg/dL 0.89   Anion Gap Latest Ref Range: 9 - 15 mEq/L 13   GFR Non- Latest Ref Range: >60  >60.0   GFR African American Latest Ref Range: >60  >60.0   Glucose Latest Ref Range: 70 - 99 mg/dL 228 (H)   Calcium Latest Ref Range: 8.5 - 9.9 mg/dL 8.7   Hemoglobin A1C Latest Ref Range: 4.8 - 5.9 % 7.6 (H)     Patient Active Problem List   Diagnosis    Depression    Essential hypertension    Family history of early CAD    [facility]    BMI 40.0-44.9, adult (HCC)    Mixed hyperlipidemia    Excessive daytime sleepiness    Snoring    JESSIAC (obstructive sleep apnea)     Allergies   Allergen Reactions    Metformin And Related      gi    Sesame Seed (Diagnostic)     Bactrim [Sulfamethoxazole-Trimethoprim] Rash       Current Outpatient Medications:     HUMALOG 100 UNIT/ML injection vial, Use via insulin pump max daily dose 100 units, Disp: 9 vial, Rfl: 1    propranolol (INDERAL) 20 MG tablet, TAKE 1 TABLET TWICE A DAY, Disp: 180 tablet, Rfl: 1    pantoprazole (PROTONIX) 40 MG tablet, TAKE 1 TABLET DAILY, Disp: 90 tablet, Rfl: 1    triamterene-hydroCHLOROthiazide (MAXZIDE-25) 37.5-25 MG per tablet, TAKE 1 TABLET DAILY, Disp: 90 tablet, Rfl: 1    citalopram (CELEXA) 20 MG tablet, TAKE 1 TABLET DAILY, Disp: 90 tablet, Rfl: 1    amitriptyline (ELAVIL) 10 MG tablet, TAKE 1 TABLET NIGHTLY, Disp: 90 tablet, Rfl: 3    atorvastatin (LIPITOR) 20 MG tablet, TAKE 1 TABLET DAILY, Disp: 90 tablet, Rfl: 2    Insulin Pen Needle (BD PEN NEEDLE MICRO U/F) 32G X 6 MM MISC, USE FOUR TIMES A DAY, Disp: 400 each, Rfl: 3    albuterol sulfate  (90 Base) MCG/ACT inhaler, Inhale 1-2 puffs into the lungs every 4 hours as needed for Wheezing or Shortness of Breath, Disp: 1 Inhaler, Rfl: 0    Insulin Pen Needle (B-D ULTRAFINE III SHORT PEN) 31G X 8 MM MISC, 1 each by Does not apply route daily, Disp: 100 each, Rfl: 3    B-D ULTRA FINE LANCETS MISC, Test daily, Disp: 100 each, Rfl: 1    Glucose Blood (BLOOD GLUCOSE TEST STRIPS) STRP, Test bid and prn, Disp: 100 strip, Rfl: 3    aspirin 81 MG tablet, Take 81 mg by mouth daily, Disp: , Rfl:     Melatonin 1 MG CAPS, Take by mouth, Disp: , Rfl:     Review of Systems    Vitals:    10/29/20 1613   BP: 117/75   Pulse: 69   SpO2: 96%   Weight: 232 lb (105.2 kg)   Height: 5' 2\" (1.575 m)       Objective:   Physical Exam  Vitals signs reviewed. Constitutional:       Appearance: Normal appearance. She is obese. HENT:      Head: Normocephalic and atraumatic. Right Ear: External ear normal.      Left Ear: External ear normal.   Neck:      Musculoskeletal: Normal range of motion and neck supple. Cardiovascular:      Rate and Rhythm: Normal rate. Musculoskeletal: Normal range of motion. Neurological:      Mental Status: She is alert. Psychiatric:         Mood and Affect: Mood normal.         Assessment:       Diagnosis Orders   1.  Type 2 diabetes mellitus treated with insulin (HCC)  Basic Metabolic Panel    Hemoglobin A1C    Microalbumin / Creatinine Urine Ratio           Plan:      Orders Placed This Encounter   Procedures    Basic Metabolic Panel     Standing Status:   Future     Standing Expiration Date:   10/29/2021    Hemoglobin A1C     Standing Status:   Future     Standing Expiration Date:   10/29/2021    Microalbumin / Creatinine Urine Ratio     Standing Status:   Future     Standing Expiration Date:   10/29/2021     Continue patient on current insulin pump as per current setting patient to talk to [facility] to get sensor CGM for well controlled auto mode  Reviewed basal rate 12 AM 2.1 unit/h  Carb ratio 5 sensitivity is 20          Shelby Dumont MD

## 2020-11-11 ENCOUNTER — HOSPITAL ENCOUNTER (OUTPATIENT)
Dept: SLEEP CENTER | Age: 55
Discharge: HOME | End: 2020-11-13
Payer: COMMERCIAL

## 2020-11-11 PROCEDURE — 95811 POLYSOM 6/>YRS CPAP 4/> PARM: CPT

## 2020-11-11 PROCEDURE — 95811 POLYSOM 6/>YRS CPAP 4/> PARM: CPT | Performed by: INTERNAL MEDICINE

## 2020-11-23 ENCOUNTER — OFFICE VISIT (OUTPATIENT)
Dept: PULMONOLOGY | Age: 55
End: 2020-11-23
Payer: COMMERCIAL

## 2020-11-23 VITALS
TEMPERATURE: 97.2 F | WEIGHT: 231 LBS | HEART RATE: 60 BPM | DIASTOLIC BLOOD PRESSURE: 70 MMHG | HEIGHT: 62 IN | OXYGEN SATURATION: 96 % | RESPIRATION RATE: 16 BRPM | SYSTOLIC BLOOD PRESSURE: 120 MMHG | BODY MASS INDEX: 42.51 KG/M2

## 2020-11-23 PROBLEM — E66.01 MORBID OBESITY (HCC): Status: ACTIVE | Noted: 2020-11-23

## 2020-11-23 PROCEDURE — G8484 FLU IMMUNIZE NO ADMIN: HCPCS | Performed by: INTERNAL MEDICINE

## 2020-11-23 PROCEDURE — 99244 OFF/OP CNSLTJ NEW/EST MOD 40: CPT | Performed by: INTERNAL MEDICINE

## 2020-11-23 PROCEDURE — G8417 CALC BMI ABV UP PARAM F/U: HCPCS | Performed by: INTERNAL MEDICINE

## 2020-11-23 PROCEDURE — G8427 DOCREV CUR MEDS BY ELIG CLIN: HCPCS | Performed by: INTERNAL MEDICINE

## 2020-11-23 ASSESSMENT — ENCOUNTER SYMPTOMS
VOICE CHANGE: 0
CHEST TIGHTNESS: 0
TROUBLE SWALLOWING: 0
EYE DISCHARGE: 0
VOMITING: 0
DIARRHEA: 0
APNEA: 1
SORE THROAT: 0
COUGH: 0
RHINORRHEA: 0
SHORTNESS OF BREATH: 0
SINUS PRESSURE: 0
ABDOMINAL PAIN: 0
NAUSEA: 0
EYE ITCHING: 0
WHEEZING: 0

## 2020-11-23 NOTE — PROGRESS NOTES
Subjective:     Eliane Andino is a 60 y.o. female who complains today of:     Chief Complaint   Patient presents with   [place] [facility]     referred by  Teetee Oden PA-C   for Sleep Study results. HPI  Patient has PSG done on 10/14/20  an shows JESSICA. AHI  11.1 RDI 14.9  CPAP titration study done on 20  shows therapeutic CPAP at 9     She is complaining of snoring and daytime sleepiness and tiredness. C/o witness apnea as per fiance. She does not wakes up with gasping for air. C/o wakes up frequently during sleep. Occasional  complaint of morning headache. She  does not have restful sleep. She  does not take daily naps. She does not fall asleep while watching TV. She  does not have a complaint of sleepiness while driving. She does have difficulty falling sleep and  staying asleep  No significant Weight gain in last 6-12 month   No sleep walking,  talking or eating . No sleep onset hallucination. No sleep paralysis . No sleep attacks. Allergies:  Metformin and related;  Sesame seed (diagnostic); and Bactrim [sulfamethoxazole-trimethoprim]  Past Medical History:   Diagnosis Date    Depression     DM2 (diabetes mellitus, type 2) ([address]     HTN (hypertension)      Past Surgical History:   Procedure Laterality Date    CATARACT REMOVAL Bilateral 2019     SECTION  1982/3/96    x3    [place] COLON CA SCRN NOT HI RSK IND N/A 2017    COLONOSCOPY performed by Magaly Emmanuel MD polyp needs '25    TONSILLECTOMY  age 8     Family History   Problem Relation Age of Onset   Guzman Thurman Stroke Mother     Kidney Disease Mother     COPD Mother     Heart Disease Father     Heart Disease Sister     Diabetes Sister     Heart Disease Brother         massive MI    Colon Cancer Neg Hx      Social History     Socioeconomic History    Marital status: Single     Spouse name: Not on file    Number of children: Not on file    Years of education: Not on file   Guzman Thurman Highest education level: Not on file   Occupational History    Not on file   Social Needs    Financial resource strain: Not on file    Food insecurity     Worry: Not on file     Inability: Not on file    Transportation needs     Medical: Not on file     Non-medical: Not on file   Tobacco Use    Smoking status: Former Smoker     Packs/day: 2.00     Years: 14.00     Pack years: 28.00     Types: Cigarettes     Start date: 1976     Last attempt to quit: 1990     Years since quittin.0    Smokeless tobacco: Never Used   Substance and Sexual Activity    Alcohol use: Not Currently     Alcohol/week: 0.0 standard drinks     Comment: occ    Drug use: No    Sexual activity: Yes   Lifestyle    Physical activity     Days per week: Not on file     Minutes per session: Not on file    Stress: Not on file   Relationships    Social connections     Talks on phone: Not on file     Gets together: Not on file     Attends Worship service: Not on file     Active member of club or organization: Not on file     Attends meetings of clubs or organizations: Not on file     Relationship status: Not on file    Intimate partner violence     Fear of current or ex partner: Not on file     Emotionally abused: Not on file     Physically abused: Not on file     Forced sexual activity: Not on file   Other Topics Concern    Not on file   Social History Narrative    Not on file         Review of Systems   Constitutional: Negative for chills, diaphoresis, fatigue and fever. HENT: Negative for congestion, mouth sores, nosebleeds, postnasal drip, rhinorrhea, sinus pressure, sneezing, sore throat, trouble swallowing and voice change. Snoring   Eyes: Negative for discharge, itching and visual disturbance. Respiratory: Positive for apnea. Negative for cough, chest tightness, shortness of breath and wheezing. Cardiovascular: Negative for chest pain, palpitations and leg swelling.    Gastrointestinal: Negative for abdominal pain, diarrhea, nausea and vomiting. Genitourinary: Negative for difficulty urinating and hematuria. Musculoskeletal: Negative for arthralgias, joint swelling and myalgias. Skin: Negative for rash. Allergic/Immunologic: Negative for environmental allergies and food allergies. Neurological: Negative for dizziness, tremors, weakness and headaches. Psychiatric/Behavioral: Positive for sleep disturbance. Negative for behavioral problems. :     Vitals:    11/23/20 1437   BP: 120/70   Pulse: 60   Resp: 16   Temp: 97.2 °F (36.2 °C)   TempSrc: Temporal   SpO2: 96%   Weight: 231 lb (104.8 kg)   Height: 5' 2\" (1.575 m)     Wt Readings from Last 3 Encounters:   11/23/20 231 lb (104.8 kg)   10/29/20 232 lb (105.2 kg)   10/09/20 231 lb (104.8 kg)         Physical Exam  Constitutional:       General: She is not in acute distress. Appearance: She is well-developed. She is obese. She is not diaphoretic. HENT:      Head: Normocephalic and atraumatic. Nose: Nose normal.      Mouth/Throat:      Comments: Narrow airway. Eyes:      Pupils: Pupils are equal, round, and reactive to light. Neck:      Thyroid: No thyromegaly. Vascular: No JVD. Trachea: No tracheal deviation. Cardiovascular:      Rate and Rhythm: Normal rate and regular rhythm. Heart sounds: No murmur. No friction rub. No gallop. Pulmonary:      Effort: No respiratory distress. Breath sounds: No wheezing or rales. Chest:      Chest wall: No tenderness. Abdominal:      General: There is no distension. Tenderness: There is no abdominal tenderness. There is no rebound. Musculoskeletal: Normal range of motion. Lymphadenopathy:      Cervical: No cervical adenopathy. Skin:     General: Skin is warm and dry. Neurological:      Mental Status: She is alert and oriented to person, place, and time.       Coordination: Coordination normal.         Current Outpatient Medications   Medication Sig Dispense Refill  CPAP Machine MISC by Does not apply route New CPAP with 9 cm 1 each 0    HUMALOG 100 UNIT/ML injection vial Use via insulin pump max daily dose 100 units 9 vial 1    propranolol (INDERAL) 20 MG tablet TAKE 1 TABLET TWICE A  tablet 1    pantoprazole (PROTONIX) 40 MG tablet TAKE 1 TABLET DAILY 90 tablet 1    triamterene-hydroCHLOROthiazide (MAXZIDE-25) 37.5-25 MG per tablet TAKE 1 TABLET DAILY 90 tablet 1    citalopram (CELEXA) 20 MG tablet TAKE 1 TABLET DAILY 90 tablet 1    amitriptyline (ELAVIL) 10 MG tablet TAKE 1 TABLET NIGHTLY 90 tablet 3    atorvastatin (LIPITOR) 20 MG tablet TAKE 1 TABLET DAILY 90 tablet 2    Insulin Pen Needle (BD PEN NEEDLE MICRO U/F) 32G X 6 MM MISC USE FOUR TIMES A  each 3    albuterol sulfate  (90 Base) MCG/ACT inhaler Inhale 1-2 puffs into the lungs every 4 hours as needed for Wheezing or Shortness of Breath 1 Inhaler 0    Insulin Pen Needle (B-D ULTRAFINE III SHORT PEN) 31G X 8 MM MISC 1 each by Does not apply route daily 100 each 3    B-D ULTRA FINE LANCETS MISC Test daily 100 each 1    Glucose Blood (BLOOD GLUCOSE TEST STRIPS) STRP Test bid and prn 100 strip 3    aspirin 81 MG tablet Take 81 mg by mouth daily      Melatonin 1 MG CAPS Take by mouth       No current facility-administered medications for this visit. Assessment/Plan:     1. JESSICA (obstructive sleep apnea)  She  has PSG done on 10/14/20  an shows JESSICA. AHI  11.1 RDI 14.9  . CPAP titration study done on 11/11/20  shows therapeutic CPAP at 9 cm. PSG and CPAP study reviewed with patient     She is complaining of snoring and daytime sleepiness and tiredness. C/o witness apnea as per fiance. She does not wakes up with gasping for air. C/o wakes up frequently during sleep. Occasional  complaint of morning headache. She  does not have restful sleep. She  does not take daily naps. she will be started on CPAP with 9 cm     Counseling: CPAP/BiPAP uses, patient advised to use CPAP at least 5-6 hours every night. Driving: patient is advised for extreme caution when driving or operating machinery if there is a feeling of drowsiness, especially while driving it is preferable to stop driving and take a brief nap. Sleep hygiene:Avoid supine sleep, sleep on  sides. Avoid  sleep deprivation. Explained sleep hygiene. Advice to avoid Alcohol and sedative. 2. Morbid obesity ([address]  Patient patient is advised try to lose weight. obesity related risk explained to the patient ,  Current weight:  231 lb (104.8 kg) Lbs. BMI:  Body mass index is 42.25 kg/m². Suggested weight control approaches, including dietary changes , exercise, behavioral modification. Return in about 2 months (around 1/23/2021) for brice.       Андрей Parry MD

## 2021-01-27 ENCOUNTER — OFFICE VISIT (OUTPATIENT)
Dept: PULMONOLOGY | Age: 56
End: 2021-01-27
Payer: COMMERCIAL

## 2021-01-27 ENCOUNTER — HOSPITAL ENCOUNTER (OUTPATIENT)
Dept: LAB | Age: 56
Discharge: HOME | End: 2021-01-27
Payer: COMMERCIAL

## 2021-01-27 VITALS
TEMPERATURE: 96.3 F | SYSTOLIC BLOOD PRESSURE: 122 MMHG | HEIGHT: 62 IN | HEART RATE: 69 BPM | RESPIRATION RATE: 16 BRPM | BODY MASS INDEX: 43.98 KG/M2 | OXYGEN SATURATION: 98 % | DIASTOLIC BLOOD PRESSURE: 80 MMHG | WEIGHT: 239 LBS

## 2021-01-27 DIAGNOSIS — E66.01 MORBID OBESITY (HCC): ICD-10-CM

## 2021-01-27 DIAGNOSIS — G47.33 OSA (OBSTRUCTIVE SLEEP APNEA): Primary | ICD-10-CM

## 2021-01-27 DIAGNOSIS — Z79.4 TYPE 2 DIABETES MELLITUS TREATED WITH INSULIN (HCC): ICD-10-CM

## 2021-01-27 DIAGNOSIS — E11.9 TYPE 2 DIABETES MELLITUS TREATED WITH INSULIN (HCC): ICD-10-CM

## 2021-01-27 LAB
ANION GAP SERPL CALCULATED.3IONS-SCNC: 12 MEQ/L (ref 9–15)
BUN BLDV-MCNC: 15 MG/DL (ref 6–20)
CALCIUM SERPL-MCNC: 9.2 MG/DL (ref 8.5–9.9)
CHLORIDE BLD-SCNC: 99 MEQ/L (ref 95–107)
CO2: 26 MEQ/L (ref 20–31)
CREAT SERPL-MCNC: 0.99 MG/DL (ref 0.5–0.9)
CREATININE URINE: 95.6 MG/DL
GFR AFRICAN AMERICAN: >60
GFR NON-AFRICAN AMERICAN: 57.6
GLUCOSE BLD-MCNC: 200 MG/DL (ref 70–99)
HBA1C MFR BLD: 7.9 % (ref 4.8–5.9)
MICROALBUMIN UR-MCNC: <1.2 MG/DL
MICROALBUMIN/CREAT UR-RTO: NORMAL MG/G (ref 0–30)
POTASSIUM SERPL-SCNC: 4 MEQ/L (ref 3.4–4.9)
SODIUM BLD-SCNC: 137 MEQ/L (ref 135–144)

## 2021-01-27 PROCEDURE — G8427 DOCREV CUR MEDS BY ELIG CLIN: HCPCS | Performed by: INTERNAL MEDICINE

## 2021-01-27 PROCEDURE — 83036 HEMOGLOBIN GLYCOSYLATED A1C: CPT

## 2021-01-27 PROCEDURE — 1036F TOBACCO NON-USER: CPT | Performed by: INTERNAL MEDICINE

## 2021-01-27 PROCEDURE — 82570 ASSAY OF URINE CREATININE: CPT

## 2021-01-27 PROCEDURE — G8417 CALC BMI ABV UP PARAM F/U: HCPCS | Performed by: INTERNAL MEDICINE

## 2021-01-27 PROCEDURE — 36415 COLL VENOUS BLD VENIPUNCTURE: CPT

## 2021-01-27 PROCEDURE — 3017F COLORECTAL CA SCREEN DOC REV: CPT | Performed by: INTERNAL MEDICINE

## 2021-01-27 PROCEDURE — G8484 FLU IMMUNIZE NO ADMIN: HCPCS | Performed by: INTERNAL MEDICINE

## 2021-01-27 PROCEDURE — 99214 OFFICE O/P EST MOD 30 MIN: CPT | Performed by: INTERNAL MEDICINE

## 2021-01-27 PROCEDURE — 82043 UR ALBUMIN QUANTITATIVE: CPT

## 2021-01-27 PROCEDURE — 80048 BASIC METABOLIC PNL TOTAL CA: CPT

## 2021-01-27 ASSESSMENT — ENCOUNTER SYMPTOMS
COUGH: 0
SHORTNESS OF BREATH: 0
RHINORRHEA: 0
EYE DISCHARGE: 0
TROUBLE SWALLOWING: 0
VOMITING: 0
SORE THROAT: 0
SINUS PRESSURE: 0
VOICE CHANGE: 0
NAUSEA: 0
WHEEZING: 0
EYE ITCHING: 0
CHEST TIGHTNESS: 0
DIARRHEA: 0
ABDOMINAL PAIN: 0

## 2021-01-27 NOTE — PROGRESS NOTES
Subjective:     Geovanna Strong is a 60 y.o. female who complains today of:     Chief Complaint   Patient presents with    Follow-up     two month f/u for JESSICA. HPI  She is using CPAP with  9  centimeters of H2O with heated humidity. She is using CPAP for about   9  hours every night. She is using CPAP with  Full face mask  Mask. She said  sleep is restful with the CPAP use. Compliance report show AHI 16.9, using CPAP use average use 9 hour   She is compliant with CPAP therapy and benefiting with CPAP use. No snoring with CPAP use. No complaint of daytime sleepiness or tiredness with CPAP use. She denies taking naps. No sleepiness with driving. She denies difficulty falling asleep or staying asleep.       Allergies:  Metformin and related, Sesame seed (diagnostic), and Bactrim [sulfamethoxazole-trimethoprim]  Past Medical History:   Diagnosis Date    Depression     DM2 (di[facility] mellitus, type 2) ([facility] [address]     HTN (hypertension)      Past Surgical History:   Procedure Laterality Date    CATARACT REMOVAL Bilateral 2019     SECTION  1982/3/96    x3    [place] COLON CA SCRN NOT HI RSK IND N/A 2017    COLONOSCOPY performed by Axel Loza MD polyp needs '25    TONSILLECTOMY  age 8     Family History   Problem Relation Age of Onset   Patricia Stroke Mother     Kidney Disease Mother     COPD Mother     Heart Disease Father     Heart Disease Sister     Diabetes Sister     Heart Disease Brother         massive MI    Colon Cancer Neg Hx      Social History     Socioeconomic History    Marital status: Single     Spouse name: Not on file    Number of children: Not on file    Years of education: Not on file    Highest education level: Not on file   Occupational History    Not on file   Social Needs    Financial resource strain: Not on file    Food insecurity     Worry: Not on file     Inability: Not on file    Transportation needs     Medical: Not on file Non-medical: Not on file   Tobacco Use    Smoking status: Former Smoker     Packs/day: 2.00     Years: 14.00     Pack years: 28.00     Types: Cigarettes     Start date: 1976     Quit date: 1990     Years since quittin.2    Smokeless tobacco: Never Used   Substance and Sexual Activity    Alcohol use: Not Currently     Alcohol/week: 0.0 standard drinks     Comment: occ    Drug use: No    Sexual activity: Yes   Lifestyle    Physical activity     Days per week: Not on file     Minutes per session: Not on file    Stress: Not on file   Relationships    Social connections     Talks on phone: Not on file     Gets together: Not on file     Attends Yarsani service: Not on file     Active member of club or organization: Not on file     Attends meetings of clubs or organizations: Not on file     Relationship status: Not on file    Intimate partner violence     Fear of current or ex partner: Not on file     Emotionally abused: Not on file     Physically abused: Not on file     Forced sexual activity: Not on file   Other Topics Concern    Not on file   Social History Narrative    Not on file         Review of Systems   Constitutional: Negative for chills, diaphoresis, fatigue and fever. HENT: Negative for congestion, mouth sores, nosebleeds, postnasal drip, rhinorrhea, sinus pressure, sneezing, sore throat, trouble swallowing and voice change. Eyes: Negative for discharge, itching and visual disturbance. Respiratory: Negative for cough, chest tightness, shortness of breath and wheezing. Cardiovascular: Negative for chest pain, palpitations and leg swelling. Gastrointestinal: Negative for abdominal pain, diarrhea, nausea and vomiting. Genitourinary: Negative for difficulty urinating and hematuria. Musculoskeletal: Negative for arthralgias, joint swelling and myalgias. Skin: Negative for rash. Allergic/Immunologic: Negative for environmental allergies and food allergies. Neurological: Negative for dizziness, tremors, weakness and headaches. Psychiatric/Behavioral: Positive for sleep disturbance. Negative for behavioral problems. :     Vitals:    01/27/21 1450   BP: 122/80   Pulse: 69   Resp: 16   Temp: 96.3 °F (35.7 °C)   TempSrc: Temporal   SpO2: 98%   Weight: 239 lb (108.4 kg)   Height: 5' 2\" (1.575 m)     Wt Readings from Last 3 Encounters:   01/27/21 239 lb (108.4 kg)   11/23/20 231 lb (104.8 kg)   10/29/20 232 lb (105.2 kg)         Physical Exam  Constitutional:       General: She is not in acute distress. Appearance: She is well-developed. She is not diaphoretic. HENT:      Head: Normocephalic and atraumatic. Nose: Nose normal.   Eyes:      Pupils: Pupils are equal, round, and reactive to light. Neck:      Thyroid: No thyromegaly. Vascular: No JVD. Trachea: No tracheal deviation. Cardiovascular:      Rate and Rhythm: Normal rate and regular rhythm. Heart sounds: No murmur. No friction rub. No gallop. Pulmonary:      Effort: No respiratory distress. Breath sounds: No wheezing or rales. Chest:      Chest wall: No tenderness. Abdominal:      General: There is no distension. Tenderness: There is no abdominal tenderness. There is no rebound. Musculoskeletal: Normal range of motion. Lymphadenopathy:      Cervical: No cervical adenopathy. Skin:     General: Skin is warm and dry. Neurological:      Mental Status: She is alert and oriented to person, place, and time.       Coordination: Coordination normal.         Current Outpatient Medications   Medication Sig Dispense Refill    CPAP Machine MISC by Does not apply route Change CPAP to Auto CPAP 5-12 cm 1 each 0    CPAP Machine MISC by Does not apply route New CPAP with 9 cm 1 each 0    HUMALOG 100 UNIT/ML injection vial Use via insulin pump max daily dose 100 units 9 vial 1    propranolol (INDERAL) 20 MG tablet TAKE 1 TABLET TWICE A  tablet 1  pantoprazole (PROTONIX) 40 MG tablet TAKE 1 TABLET DAILY 90 tablet 1    triamterene-hydroCHLOROthiazide (MAXZIDE-25) 37.5-25 MG per tablet TAKE 1 TABLET DAILY 90 tablet 1    citalopram (CELEXA) 20 MG tablet TAKE 1 TABLET DAILY 90 tablet 1    amitriptyline (ELAVIL) 10 MG tablet TAKE 1 TABLET NIGHTLY 90 tablet 3    atorvastatin (LIPITOR) 20 MG tablet TAKE 1 TABLET DAILY 90 tablet 2    Insulin Pen Needle (BD PEN NEEDLE MICRO U/F) 32G X 6 MM MISC USE FOUR TIMES A  each 3    albuterol sulfate  (90 Base) MCG/ACT inhaler Inhale 1-2 puffs into the lungs every 4 hours as needed for Wheezing or Shortness of Breath 1 Inhaler 0    Insulin Pen Needle (B-D ULTRAFINE III SHORT PEN) 31G X 8 MM MISC 1 each by Does not apply route daily 100 each 3    B-D ULTRA FINE LANCETS MISC Test daily 100 each 1    Glucose Blood (BLOOD GLUCOSE TEST STRIPS) STRP Test bid and prn 100 strip 3    aspirin 81 MG tablet Take 81 mg by mouth daily      Melatonin 1 MG CAPS Take by mouth       No current facility-administered medications for this visit. Assessment/Plan:     1. JESSICA (obstructive sleep apnea)  She is using CPAP with  9  centimeters of H2O with heated humidity. She is using CPAP for about   9  hours every night. She is using CPAP with  Full face mask  Mask. She said  sleep is restful with the CPAP use. Compliance report show AHI 16.9, using CPAP use average use 9 hour She is compliant with CPAP therapy and benefiting with CPAP use. No snoring with CPAP use. Since patient still has high AHI 16.9 cm. Counseling: CPAP/BiPAP uses, She advised to use CPAP at least 5-6 hours every night. Driving: She is advised for extreme caution when driving or operating machinery if there is a feeling of drowsiness, especially while driving it is preferable to stop driving and take a brief nap. Sleep hygiene:Avoid supine sleep, sleep on  sides. Avoid  sleep deprivation. Explained sleep hygiene. Advice to avoid Alcohol and sedative    - CPAP change to Auto 5-12 cm     Time spend over 30  min. Face to face. with greater than 50 % time with counseling regarding CPAP therapy. 2. Morbid obesity ([address]  She is advised try to lose weight. obesity related risk explained to the patient ,  Current weight:  239 lb (108.4 kg) Lbs. BMI:  Body mass index is 43.71 kg/m². Suggested weight control approaches, including dietary changes , exercise, behavioral modification. Return in about 2 months (around 3/27/2021).       Agapito Nino MD

## 2021-01-29 ENCOUNTER — OFFICE VISIT (OUTPATIENT)
Dept: ENDOCRINOLOGY | Age: 56
End: 2021-01-29
Payer: COMMERCIAL

## 2021-01-29 VITALS
DIASTOLIC BLOOD PRESSURE: 77 MMHG | OXYGEN SATURATION: 96 % | WEIGHT: 233 LBS | HEART RATE: 69 BPM | BODY MASS INDEX: 42.88 KG/M2 | HEIGHT: 62 IN | SYSTOLIC BLOOD PRESSURE: 125 MMHG

## 2021-01-29 DIAGNOSIS — Z79.4 TYPE 2 DIABETES MELLITUS TREATED WITH INSULIN (HCC): Primary | ICD-10-CM

## 2021-01-29 DIAGNOSIS — E11.9 TYPE 2 DIABETES MELLITUS TREATED WITH INSULIN (HCC): Primary | ICD-10-CM

## 2021-01-29 LAB
CHP ED QC CHECK: NORMAL
GLUCOSE BLD-MCNC: 235 MG/DL

## 2021-01-29 PROCEDURE — 2022F DILAT RTA XM EVC RTNOPTHY: CPT | Performed by: INTERNAL MEDICINE

## 2021-01-29 PROCEDURE — G8417 CALC BMI ABV UP PARAM F/U: HCPCS | Performed by: INTERNAL MEDICINE

## 2021-01-29 PROCEDURE — G8427 DOCREV CUR MEDS BY ELIG CLIN: HCPCS | Performed by: INTERNAL MEDICINE

## 2021-01-29 PROCEDURE — 3051F HG A1C>EQUAL 7.0%<8.0%: CPT | Performed by: INTERNAL MEDICINE

## 2021-01-29 PROCEDURE — G8484 FLU IMMUNIZE NO ADMIN: HCPCS | Performed by: INTERNAL MEDICINE

## 2021-01-29 PROCEDURE — 99213 OFFICE O/P EST LOW 20 MIN: CPT | Performed by: INTERNAL MEDICINE

## 2021-01-29 PROCEDURE — 82962 GLUCOSE BLOOD TEST: CPT | Performed by: INTERNAL MEDICINE

## 2021-01-29 PROCEDURE — 3017F COLORECTAL CA SCREEN DOC REV: CPT | Performed by: INTERNAL MEDICINE

## 2021-01-29 PROCEDURE — 1036F TOBACCO NON-USER: CPT | Performed by: INTERNAL MEDICINE

## 2021-01-29 NOTE — PROGRESS NOTES
Subjective:      Patient ID: Chaparro Cheung is a 60 y.o. female. Follow-up on type 2 diabetes patient currently on Medtronic 630 pump explained to get the sensor CGM reviewed pump download A1c is gone up slightly to 7.9  Diabetes  She presents for her follow-up diabetic visit. She has type 2 diabetes mellitus. Symptoms are worsening. Current diabetic treatment includes insulin pump. Her overall blood glucose range is 180-200 mg/dl. (Lab Results       Component                Value               Date                       LABA1C                   7.9 (H)             01/27/2021              Reviewed 2-week pump download  Reviewed pump settings basal rate 2.1 units/h carb ratio was 5 sensitivity was 20  Average blood sugars between 168±35 higher sugars later in the evening no severe hypoglycemia)        Results for Hannah Cota (MRN [id number]) as of 1/29/2021 16:27   Ref.  Range 10/27/2020 08:19 1/27/2021 08:01 1/29/2021 16:01   Sodium Latest Ref Range: 135 - 144 mEq/L 138 137    Potassium Latest Ref Range: 3.4 - 4.9 mEq/L 4.2 4.0    Chloride Latest Ref Range: 95 - 107 mEq/L 98 99    CO2 Latest Ref Range: 20 - 31 mEq/L 27 26    BUN Latest Ref Range: 6 - 20 mg/dL 19 15    Creatinine Latest Ref Range: 0.50 - 0.90 mg/dL 0.89 0.99 (H)    Anion Gap Latest Ref Range: 9 - 15 mEq/L 13 12    GFR Non- Latest Ref Range: >60  >60.0 57.6 (L)    GFR  Latest Ref Range: >60  >60.0 >60.0    Glucose Latest Units: mg/dL 228 (H) 200 (H) 235   Calcium Latest Ref Range: 8.5 - 9.9 mg/dL 8.7 9.2    Hemoglobin A1C Latest Ref Range: 4.8 - 5.9 % 7.6 (H) 7.9 (H)    Creatinine, Ur Latest Ref Range: Not Established mg/dL  95.6    Microalbumin Creatinine Ratio Latest Ref Range: 0.0 - 30.0 mg/G  see below    Microalbumin, Random Urine Latest Ref Range: Not Established mg/dL  <1.20      Patient Active Problem List   Diagnosis    Depression    Essential hypertension    Family history of early CAD  Type 2 diabetes mellitus treated with insulin (HCC)    BMI 40.0-44.9, adult ([facility]    Mixed hyperlipidemia    Excessive daytime sleepiness    Snoring    JESSICA (obstructive sleep apnea)    Morbid obesity ([facility])     Allergies   Allergen Reactions    Metformin And Related      gi    Sesame Seed (Diagnostic)     Bactrim [Sulfamethoxazole-Trimethoprim] Rash       Current Outpatient Medications:     CPAP Machine MISC, by Does not apply route Change CPAP to Auto CPAP 5-12 cm, Disp: 1 each, Rfl: 0    CPAP Machine MISC, by Does not apply route New CPAP with 9 cm, Disp: 1 each, Rfl: 0    HUMALOG 100 UNIT/ML injection vial, Use via insulin pump max daily dose 100 units, Disp: 9 vial, Rfl: 1    propranolol (INDERAL) 20 MG tablet, TAKE 1 TABLET TWICE A DAY, Disp: 180 tablet, Rfl: 1    pantoprazole (PROTONIX) 40 MG tablet, TAKE 1 TABLET DAILY, Disp: 90 tablet, Rfl: 1    triamterene-hydroCHLOROthiazide (MAXZIDE-25) 37.5-25 MG per tablet, TAKE 1 TABLET DAILY, Disp: 90 tablet, Rfl: 1    citalopram (CELEXA) 20 MG tablet, TAKE 1 TABLET DAILY, Disp: 90 tablet, Rfl: 1    amitriptyline (ELAVIL) 10 MG tablet, TAKE 1 TABLET NIGHTLY, Disp: 90 tablet, Rfl: 3    atorvastatin (LIPITOR) 20 MG tablet, TAKE 1 TABLET DAILY, Disp: 90 tablet, Rfl: 2    Insulin Pen Needle (BD PEN NEEDLE MICRO U/F) 32G X 6 MM MISC, USE FOUR TIMES A DAY, Disp: 400 each, Rfl: 3    albuterol sulfate  (90 Base) MCG/ACT inhaler, Inhale 1-2 puffs into the lungs every 4 hours as needed for Wheezing or Shortness of Breath, Disp: 1 Inhaler, Rfl: 0    Insulin Pen Needle (B-D ULTRAFINE III SHORT PEN) 31G X 8 MM MISC, 1 each by Does not apply route daily, Disp: 100 each, Rfl: 3    B-D ULTRA FINE LANCETS MISC, Test daily, Disp: 100 each, Rfl: 1    Glucose Blood (BLOOD GLUCOSE TEST STRIPS) STRP, Test bid and prn, Disp: 100 strip, Rfl: 3    aspirin 81 MG tablet, Take 81 mg by mouth daily, Disp: , Rfl:   Melatonin 1 MG CAPS, Take by mouth, Disp: , Rfl:       Review of Systems   Endocrine: Negative. All other systems reviewed and are negative. Vitals:    01/29/21 1602   BP: 125/77   Pulse: 69   SpO2: 96%   Weight: 233 lb (105.7 kg)   Height: 5' 2\" (1.575 m)       Objective:   Physical Exam  Vitals signs reviewed. Constitutional:       Appearance: Normal appearance. She is obese. HENT:      Head: Normocephalic and atraumatic. Right Ear: External ear normal.      Left Ear: External ear normal.      Nose: Nose normal.   Neck:      Musculoskeletal: Normal range of motion and neck supple. Cardiovascular:      Rate and Rhythm: Normal rate. Pulmonary:      Effort: Pulmonary effort is normal.   Musculoskeletal: Normal range of motion. Neurological:      General: No focal deficit present. Mental Status: She is alert and oriented to person, place, and time. Psychiatric:         Mood and Affect: Mood normal.         Assessment:       Diagnosis Orders   1.  Type 2 diabetes mellitus treated with insulin ([facility])  POCT Glucose    Basic Metabolic Panel    Hemoglobin A1C           Plan:      Orders Placed This Encounter   Procedures    Basic Metabolic Panel     Standing Status:   Future     Standing Expiration Date:   1/29/2022    Hemoglobin A1C     Standing Status:   Future     Standing Expiration Date:   1/29/2022    POCT Glucose     Continue insulin pump as per current setting patient educated about the updated Medtronic 770 pump with sensor    For better control follow-up in 3 months time        Darling Russell MD

## 2021-03-29 ENCOUNTER — OFFICE VISIT (OUTPATIENT)
Dept: PULMONOLOGY | Age: 56
End: 2021-03-29
Payer: COMMERCIAL

## 2021-03-29 VITALS
SYSTOLIC BLOOD PRESSURE: 125 MMHG | HEIGHT: 62 IN | TEMPERATURE: 97.5 F | BODY MASS INDEX: 43.98 KG/M2 | WEIGHT: 239 LBS | DIASTOLIC BLOOD PRESSURE: 66 MMHG | OXYGEN SATURATION: 98 % | HEART RATE: 78 BPM

## 2021-03-29 DIAGNOSIS — G47.33 OSA (OBSTRUCTIVE SLEEP APNEA): Primary | ICD-10-CM

## 2021-03-29 DIAGNOSIS — E66.01 MORBID OBESITY (HCC): ICD-10-CM

## 2021-03-29 PROCEDURE — G8427 DOCREV CUR MEDS BY ELIG CLIN: HCPCS | Performed by: INTERNAL MEDICINE

## 2021-03-29 PROCEDURE — 1036F TOBACCO NON-USER: CPT | Performed by: INTERNAL MEDICINE

## 2021-03-29 PROCEDURE — 3017F COLORECTAL CA SCREEN DOC REV: CPT | Performed by: INTERNAL MEDICINE

## 2021-03-29 PROCEDURE — G8417 CALC BMI ABV UP PARAM F/U: HCPCS | Performed by: INTERNAL MEDICINE

## 2021-03-29 PROCEDURE — 99214 OFFICE O/P EST MOD 30 MIN: CPT | Performed by: INTERNAL MEDICINE

## 2021-03-29 PROCEDURE — G8484 FLU IMMUNIZE NO ADMIN: HCPCS | Performed by: INTERNAL MEDICINE

## 2021-03-29 ASSESSMENT — ENCOUNTER SYMPTOMS
VOICE CHANGE: 0
COUGH: 0
WHEEZING: 0
CHEST TIGHTNESS: 0
VOMITING: 0
NAUSEA: 0
RHINORRHEA: 0
EYE DISCHARGE: 0
SHORTNESS OF BREATH: 0
SINUS PRESSURE: 0
ABDOMINAL PAIN: 0
DIARRHEA: 0
TROUBLE SWALLOWING: 0
SORE THROAT: 0
EYE ITCHING: 0

## 2021-03-29 NOTE — PROGRESS NOTES
Subjective:     Carito Coon is a 60 y.o. female who complains today of:     Chief Complaint   Patient presents with    Sleep Apnea     2 month f/u       HPI  She is using CPAP with  9 centimeters of H2O with heated humidity. She said he pressure is not change it to auto CPAP   She is using CPAP for about 8-9  hours every night. She is using CPAP with  Full face  Mask. She said  sleep is restful with the CPAP use. She is compliant with CPAP therapy and benefiting with CPAP use. No snoring with CPAP use. No complaint of daytime sleepiness or tiredness with CPAP use. She denies taking naps. No sleepiness with driving. She denies difficulty falling asleep or staying asleep. I reviewed compliance report with the patient regarding her CPAP therapy. Patient use CPAP for 30 days out of 30 days  Average use 8 hours and 53 min average AHI 9.7 .            Allergies:  Metformin and related, Sesame seed (diagnostic), and Bactrim [sulfamethoxazole-trimethoprim]  Past Medical History:   Diagnosis Date    Depression     DM2 (diabetes mellitus, type 2) ([facility] [address]     HTN (hypertension)      Past Surgical History:   Procedure Laterality Date    CATARACT REMOVAL Bilateral 2019,      SECTION  1982/3/96    x3    MO COLON CA SCRN NOT HI RSK IND N/A 2017    COLONOSCOPY performed by Briseida Peters MD polyp needs '25    TONSILLECTOMY  age 8     Family History   Problem Relation Age of Onset   Katrin Latin Stroke Mother     Kidney Disease Mother     COPD Mother     Heart Disease Father     Heart Disease Sister     Diabetes Sister     Heart Disease Brother         massive MI    Colon Cancer Neg Hx      Social History     Socioeconomic History    Marital status: Single     Spouse name: Not on file    Number of children: Not on file    Years of education: Not on file    Highest education level: Not on file   Occupational History    Not on file   Social Needs    Financial resource strain: Not on file myalgias. Skin: Negative for rash. Allergic/Immunologic: Negative for environmental allergies and food allergies. Neurological: Negative for dizziness, tremors, weakness and headaches. Psychiatric/Behavioral: Positive for sleep disturbance. Negative for behavioral problems and suicidal ideas.         :     Vitals:    03/29/21 1516 03/29/21 1521   BP: (!) 148/73 125/66   Site: Left Upper Arm Right Upper Arm   Position: Sitting Sitting   Cuff Size: Large Adult Large Adult   Pulse: 78    Temp: 97.5 °F (36.4 °C)    SpO2: 98%    Weight: 239 lb (108.4 kg)    Height: 5' 2\" (1.575 m)      Wt Readings from Last 3 Encounters:   03/29/21 239 lb (108.4 kg)   01/29/21 233 lb (105.7 kg)   01/27/21 239 lb (108.4 kg)         Physical Exam  Constitutional:       General: She is not in acute distress. Appearance: She is well-developed. She is obese. She is not diaphoretic. HENT:      Head: Normocephalic and atraumatic. Nose: Nose normal.   Eyes:      Pupils: Pupils are equal, round, and reactive to light. Neck:      Thyroid: No thyromegaly. Vascular: No JVD. Trachea: No tracheal deviation. Cardiovascular:      Rate and Rhythm: Normal rate and regular rhythm. Heart sounds: No murmur. No friction rub. No gallop. Pulmonary:      Effort: No respiratory distress. Breath sounds: No wheezing or rales. Chest:      Chest wall: No tenderness. Abdominal:      General: There is no distension. Tenderness: There is no abdominal tenderness. There is no rebound. Musculoskeletal: Normal range of motion. Lymphadenopathy:      Cervical: No cervical adenopathy. Skin:     General: Skin is warm and dry. Neurological:      Mental Status: She is alert and oriented to person, place, and time.       Coordination: Coordination normal.         Current Outpatient Medications   Medication Sig Dispense Refill    HUMALOG 100 UNIT/ML injection vial USE VIA INSULIN PUMP MAXIMUM DAILY DOSE 100 UNITS 90 mL 3    atorvastatin (LIPITOR) 20 MG tablet TAKE 1 TABLET DAILY 90 tablet 0    triamterene-hydroCHLOROthiazide (MAXZIDE-25) 37.5-25 MG per tablet TAKE 1 TABLET DAILY 90 tablet 3    citalopram (CELEXA) 20 MG tablet TAKE 1 TABLET DAILY 90 tablet 3    propranolol (INDERAL) 20 MG tablet TAKE 1 TABLET TWICE A  tablet 3    pantoprazole (PROTONIX) 40 MG tablet TAKE 1 TABLET DAILY 90 tablet 3    CPAP Machine MISC by Does not apply route Change CPAP to Auto CPAP 5-12 cm 1 each 0    CPAP Machine MISC by Does not apply route New CPAP with 9 cm 1 each 0    amitriptyline (ELAVIL) 10 MG tablet TAKE 1 TABLET NIGHTLY 90 tablet 3    Insulin Pen Needle (BD PEN NEEDLE MICRO U/F) 32G X 6 MM MISC USE FOUR TIMES A  each 3    albuterol sulfate  (90 Base) MCG/ACT inhaler Inhale 1-2 puffs into the lungs every 4 hours as needed for Wheezing or Shortness of Breath 1 Inhaler 0    Insulin Pen Needle (B-D ULTRAFINE III SHORT PEN) 31G X 8 MM MISC 1 each by Does not apply route daily 100 each 3    B-D ULTRA FINE LANCETS MISC Test daily 100 each 1    Glucose Blood (BLOOD GLUCOSE TEST STRIPS) STRP Test bid and prn 100 strip 3    aspirin 81 MG tablet Take 81 mg by mouth daily      Melatonin 1 MG CAPS Take by mouth       No current facility-administered medications for this visit. Assessment/Plan:     1. JESSICA (obstructive sleep apnea)  She is using CPAP with  9 centimeters of H2O with heated humidity. She said he pressure is not change it to auto CPAP She is using CPAP for about 8-9  hours every night. She is using CPAP with  Full face  Mask. She said  sleep is restful with the CPAP use. She is compliant with CPAP therapy and benefiting with CPAP use. No snoring with CPAP use. No complaint of daytime sleepiness or tiredness with CPAP use. I reviewed compliance report with the patient regarding her CPAP therapy. Patient use CPAP for 30 days out of 30 days.  Average use 8 hours and 53 min average AHI 9.7 Counseling: CPAP/BiPAP uses, She advised to use CPAP at least 5-6 hours every night. Driving: She is advised for extreme caution when driving or operating machinery if there is a feeling of drowsiness, especially while driving it is preferable to stop driving and take a brief nap. Sleep hygiene:Avoid supine sleep, sleep on  sides. Avoid  sleep deprivation. Explained sleep hygiene. Advice to avoid Alcohol and sedative. Time spend over 30 min. Face to face. with greater than 50 % time with counseling regarding CPAP therapy. 2. Morbid obesity ([address]  She is advised try to lose weight. obesity related risk explained to the patient ,  Current weight:  239 lb (108.4 kg) Lbs. BMI:  Body mass index is 43.71 kg/m². Suggested weight control approaches, including dietary changes , exercise, behavioral modification. Return in about 3 months (around 7/6/2021) for brice.       Huseyin Peralta MD

## 2021-04-09 ENCOUNTER — OFFICE VISIT (OUTPATIENT)
Dept: FAMILY MEDICINE CLINIC | Age: 56
End: 2021-04-09
Payer: COMMERCIAL

## 2021-04-09 VITALS
HEIGHT: 62 IN | DIASTOLIC BLOOD PRESSURE: 82 MMHG | SYSTOLIC BLOOD PRESSURE: 132 MMHG | OXYGEN SATURATION: 97 % | WEIGHT: 244 LBS | TEMPERATURE: 97.2 F | BODY MASS INDEX: 44.9 KG/M2 | RESPIRATION RATE: 20 BRPM | HEART RATE: 80 BPM

## 2021-04-09 DIAGNOSIS — Z79.4 TYPE 2 DIABETES MELLITUS TREATED WITH INSULIN (HCC): ICD-10-CM

## 2021-04-09 DIAGNOSIS — E78.2 MIXED HYPERLIPIDEMIA: ICD-10-CM

## 2021-04-09 DIAGNOSIS — G47.33 OSA (OBSTRUCTIVE SLEEP APNEA): ICD-10-CM

## 2021-04-09 DIAGNOSIS — R53.82 CHRONIC FATIGUE: ICD-10-CM

## 2021-04-09 DIAGNOSIS — I10 ESSENTIAL HYPERTENSION: ICD-10-CM

## 2021-04-09 DIAGNOSIS — E11.9 TYPE 2 DIABETES MELLITUS TREATED WITH INSULIN (HCC): ICD-10-CM

## 2021-04-09 LAB
T4 FREE: 1.15 NG/DL (ref 0.84–1.68)
TSH SERPL DL<=0.05 MIU/L-ACNC: 1.87 UIU/ML (ref 0.44–3.86)
VITAMIN D 25-HYDROXY: 12.9 NG/ML (ref 30–100)

## 2021-04-09 PROCEDURE — G8427 DOCREV CUR MEDS BY ELIG CLIN: HCPCS | Performed by: PHYSICIAN ASSISTANT

## 2021-04-09 PROCEDURE — G8417 CALC BMI ABV UP PARAM F/U: HCPCS | Performed by: PHYSICIAN ASSISTANT

## 2021-04-09 PROCEDURE — 3017F COLORECTAL CA SCREEN DOC REV: CPT | Performed by: PHYSICIAN ASSISTANT

## 2021-04-09 PROCEDURE — 3051F HG A1C>EQUAL 7.0%<8.0%: CPT | Performed by: PHYSICIAN ASSISTANT

## 2021-04-09 PROCEDURE — 99214 OFFICE O/P EST MOD 30 MIN: CPT | Performed by: PHYSICIAN ASSISTANT

## 2021-04-09 PROCEDURE — 1036F TOBACCO NON-USER: CPT | Performed by: PHYSICIAN ASSISTANT

## 2021-04-09 PROCEDURE — 2022F DILAT RTA XM EVC RTNOPTHY: CPT | Performed by: PHYSICIAN ASSISTANT

## 2021-04-09 NOTE — PROGRESS NOTES
[address] [facility], 60 y.o. female presents today with:  Chief Complaint   Patient presents with    Diabetes     6 month follow up      HPI  Last OV with me: 10/9/2020  Overall, feeling well today. JESSICA/Fatigue. Was having excessive sleepiness during the day. Had sleep study completed which was positive for JESSICA. CPAP therapy has been initiated. Reports that she is sleeping significantly better, 8-9 hours/night. Compliant with nightly use. Still having some mid-afternoon/evening fatigue. Last OV with pulmonology was on 3/19/2021 with Dr. Helio Erickson.      Type 2 diabetes.    Routinely followed by endocrinology, Dr. Toño Avila was 1/29/2021.    HgbA1c of 7.9% (1/27/2021)  Current therapy: insulin pump, doing really well.    UTD with microalbumin.   No hypo/hyperglycemic symptoms. She is on statin, 81 mg asa. Walking for exercise.      HTN. Doing well on medication.  BP controlled. Denies CP, dizziness, SOB, CUELLAR, edema of LE.      Mixed hyperlipidemia.    Due for repeat lipid. She is taking lipitor without myalgias or muscle weakness.      Anxiety and depression.    Stable on current medication regimen.  Denies worsening depressed mood/anxiety. Taking celexa 20 mg and elavil 10 mg.         Review of Systems   Constitutional: Positive for fatigue. Negative for activity change, appetite change, diaphoresis, fever and unexpected weight change. HENT: Negative for congestion, dental problem and trouble swallowing. Eyes: Negative for visual disturbance. Respiratory: Negative for cough, chest tightness, shortness of breath and wheezing. Cardiovascular: Negative for chest pain, palpitations and leg swelling. Gastrointestinal: Negative for abdominal distention, anal bleeding, diarrhea, nausea and rectal pain. Endocrine: Negative for polydipsia, polyphagia and polyuria. Musculoskeletal: Negative for arthralgias and back pain. Skin: Negative for color change.    Neurological: Negative for dizziness, weakness, light-headedness, numbness and headaches. Psychiatric/Behavioral: Negative for dysphoric mood and sleep disturbance. The patient is not nervous/anxious.       Past Medical History:   Diagnosis Date    Depression     DM2 (diabetes mellitus, type 2) ([address]     HTN (hypertension)      Past Surgical History:   Procedure Laterality Date    CATARACT REMOVAL Bilateral 2019,      SECTION  1982/3/96    x3    [place] COLON CA SCRN NOT HI RSK IND N/A 2017    COLONOSCOPY performed by Destini Foreman MD polyp needs '25    TONSILLECTOMY  age 8     Social History     Socioeconomic History    Marital status: Single     Spouse name: Not on file    Number of children: Not on file    Years of education: Not on file    Highest education level: Not on file   Occupational History    Not on file   Social Needs    Financial resource strain: Not on file    Food insecurity     Worry: Not on file     Inability: Not on file    Transportation needs     Medical: Not on file     Non-medical: Not on file   Tobacco Use    Smoking status: Former Smoker     Packs/day: 2.00     Years: 14.00     Pack years: 28.00     Types: Cigarettes     Start date: 1976     Quit date: 1990     Years since quittin.4    Smokeless tobacco: Never Used   Substance and Sexual Activity    Alcohol use: Not Currently     Alcohol/week: 0.0 standard drinks     Comment: occ    Drug use: No    Sexual activity: Yes   Lifestyle    Physical activity     Days per week: Not on file     Minutes per session: Not on file    Stress: Not on file   Relationships    Social connections     Talks on phone: Not on file     Gets together: Not on file     Attends Hinduism service: Not on file     Active member of club or organization: Not on file     Attends meetings of clubs or organizations: Not on file     Relationship status: Not on file    Intimate partner violence     Fear of current or ex partner: Not on file     Emotionally abused: Not on file     Physically abused: Not on file     Forced sexual activity: Not on file   Other Topics Concern    Not on file   Social History Narrative    Not on file     Family History   Problem Relation Age of Onset    Stroke Mother     Kidney Disease Mother     COPD Mother     Heart Disease Father     Heart Disease Sister     Diabetes Sister     Heart Disease Brother         massive MI    Colon Cancer Neg Hx      Allergies   Allergen Reactions    Metformin And Related      gi    Sesame Seed (Diagnostic)     Bactrim [Sulfamethoxazole-Trimethoprim] Rash     Current Outpatient Medications   Medication Sig Dispense Refill    HUMALOG 100 UNIT/ML injection vial USE VIA INSULIN PUMP MAXIMUM DAILY DOSE 100 UNITS 90 mL 3    atorvastatin (LIPITOR) 20 MG tablet TAKE 1 TABLET DAILY 90 tablet 0    triamterene-hydroCHLOROthiazide (MAXZIDE-25) 37.5-25 MG per tablet TAKE 1 TABLET DAILY 90 tablet 3    citalopram (CELEXA) 20 MG tablet TAKE 1 TABLET DAILY 90 tablet 3    propranolol (INDERAL) 20 MG tablet TAKE 1 TABLET TWICE A  tablet 3    pantoprazole (PROTONIX) 40 MG tablet TAKE 1 TABLET DAILY 90 tablet 3    CPAP Machine MISC by Does not apply route Change CPAP to Auto CPAP 5-12 cm 1 each 0    CPAP Machine MISC by Does not apply route New CPAP with 9 cm 1 each 0    amitriptyline (ELAVIL) 10 MG tablet TAKE 1 TABLET NIGHTLY 90 tablet 3    Insulin Pen Needle (BD PEN NEEDLE MICRO U/F) 32G X 6 MM MISC USE FOUR TIMES A  each 3    albuterol sulfate  (90 Base) MCG/ACT inhaler Inhale 1-2 puffs into the lungs every 4 hours as needed for Wheezing or Shortness of Breath 1 Inhaler 0    Insulin Pen Needle (B-D ULTRAFINE III SHORT PEN) 31G X 8 MM MISC 1 each by Does not apply route daily 100 each 3    B-D ULTRA FINE LANCETS MISC Test daily 100 each 1    Glucose Blood (BLOOD GLUCOSE TEST STRIPS) STRP Test bid and prn 100 strip 3    aspirin 81 MG tablet Take 81 mg by mouth daily      Melatonin 1 MG CAPS Take by mouth       No current facility-administered medications for this visit. PMH, Surgical Hx, Family Hx, and Social Hx reviewed and updated. Health Maintenance reviewed. Objective  Vitals:    04/09/21 1447   BP: 132/82   Pulse: 80   Resp: 20   Temp: 97.2 °F (36.2 °C)   TempSrc: Temporal   SpO2: 97%   Weight: 244 lb (110.7 kg)   Height: 5' 2\" (1.575 m)     BP Readings from Last 3 Encounters:   04/09/21 132/82   03/29/21 125/66   01/29/21 125/77     Wt Readings from Last 3 Encounters:   04/09/21 244 lb (110.7 kg)   03/29/21 239 lb (108.4 kg)   01/29/21 233 lb (105.7 kg)     Physical Exam  Vitals signs and nursing note reviewed. Constitutional:       General: She is not in acute distress. Appearance: She is well-developed. She is obese. She is not ill-appearing or diaphoretic. Comments: Sitting comfortably in exam room. NAD. HENT:      Head: Normocephalic and atraumatic. Right Ear: Tympanic membrane, ear canal and external ear normal.      Left Ear: Tympanic membrane, ear canal and external ear normal.      Nose: Nose normal.      Mouth/Throat:      Mouth: Mucous membranes are moist.   Eyes:      Conjunctiva/sclera: Conjunctivae normal.   Neck:      Musculoskeletal: Normal range of motion and neck supple. Cardiovascular:      Rate and Rhythm: Normal rate and regular rhythm. Heart sounds: Normal heart sounds. Pulmonary:      Effort: Pulmonary effort is normal. No respiratory distress. Breath sounds: Normal breath sounds. Musculoskeletal: Normal range of motion. Skin:     General: Skin is warm and dry. Neurological:      General: No focal deficit present. Mental Status: She is alert and oriented to person, place, and time.    Psychiatric:         Attention and Perception: Attention normal.         Mood and Affect: Mood and affect normal.         Speech: Speech normal.         Behavior: Behavior normal.         Thought Content:

## 2021-04-11 PROBLEM — G47.19 EXCESSIVE DAYTIME SLEEPINESS: Status: RESOLVED | Noted: 2020-10-09 | Resolved: 2021-04-11

## 2021-04-11 PROBLEM — R53.82 CHRONIC FATIGUE: Status: ACTIVE | Noted: 2021-04-11

## 2021-04-11 ASSESSMENT — ENCOUNTER SYMPTOMS
SHORTNESS OF BREATH: 0
ANAL BLEEDING: 0
DIARRHEA: 0
ABDOMINAL DISTENTION: 0
BACK PAIN: 0
COUGH: 0
COLOR CHANGE: 0
TROUBLE SWALLOWING: 0
RECTAL PAIN: 0
NAUSEA: 0
CHEST TIGHTNESS: 0
WHEEZING: 0

## 2021-04-27 ENCOUNTER — HOSPITAL ENCOUNTER (OUTPATIENT)
Dept: LAB | Age: 56
Discharge: HOME | End: 2021-04-27
Payer: COMMERCIAL

## 2021-04-27 DIAGNOSIS — E11.9 TYPE 2 DIABETES MELLITUS TREATED WITH INSULIN (HCC): ICD-10-CM

## 2021-04-27 DIAGNOSIS — Z79.4 TYPE 2 DIABETES MELLITUS TREATED WITH INSULIN (HCC): ICD-10-CM

## 2021-04-27 LAB
ANION GAP SERPL CALCULATED.3IONS-SCNC: 14 MEQ/L (ref 9–15)
BUN BLDV-MCNC: 17 MG/DL (ref 6–20)
CALCIUM SERPL-MCNC: 9 MG/DL (ref 8.5–9.9)
CHLORIDE BLD-SCNC: 98 MEQ/L (ref 95–107)
CO2: 25 MEQ/L (ref 20–31)
CREAT SERPL-MCNC: 0.99 MG/DL (ref 0.5–0.9)
GFR AFRICAN AMERICAN: >60
GFR NON-AFRICAN AMERICAN: 57.6
GLUCOSE BLD-MCNC: 203 MG/DL (ref 70–99)
HBA1C MFR BLD: 8.2 % (ref 4.8–5.9)
POTASSIUM SERPL-SCNC: 4 MEQ/L (ref 3.4–4.9)
SODIUM BLD-SCNC: 137 MEQ/L (ref 135–144)

## 2021-04-27 PROCEDURE — 83036 HEMOGLOBIN GLYCOSYLATED A1C: CPT

## 2021-04-27 PROCEDURE — 36415 COLL VENOUS BLD VENIPUNCTURE: CPT

## 2021-04-27 PROCEDURE — 80048 BASIC METABOLIC PNL TOTAL CA: CPT

## 2021-04-30 ENCOUNTER — OFFICE VISIT (OUTPATIENT)
Dept: ENDOCRINOLOGY | Age: 56
End: 2021-04-30
Payer: COMMERCIAL

## 2021-04-30 VITALS
HEART RATE: 64 BPM | HEIGHT: 62 IN | BODY MASS INDEX: 44.53 KG/M2 | OXYGEN SATURATION: 97 % | DIASTOLIC BLOOD PRESSURE: 74 MMHG | WEIGHT: 242 LBS | SYSTOLIC BLOOD PRESSURE: 110 MMHG

## 2021-04-30 DIAGNOSIS — E11.9 TYPE 2 DIABETES MELLITUS TREATED WITH INSULIN (HCC): Primary | ICD-10-CM

## 2021-04-30 DIAGNOSIS — Z79.4 TYPE 2 DIABETES MELLITUS TREATED WITH INSULIN (HCC): Primary | ICD-10-CM

## 2021-04-30 LAB
CHP ED QC CHECK: NORMAL
GLUCOSE BLD-MCNC: 214 MG/DL

## 2021-04-30 PROCEDURE — 82962 GLUCOSE BLOOD TEST: CPT | Performed by: INTERNAL MEDICINE

## 2021-04-30 PROCEDURE — G8417 CALC BMI ABV UP PARAM F/U: HCPCS | Performed by: INTERNAL MEDICINE

## 2021-04-30 PROCEDURE — G8427 DOCREV CUR MEDS BY ELIG CLIN: HCPCS | Performed by: INTERNAL MEDICINE

## 2021-04-30 PROCEDURE — 1036F TOBACCO NON-USER: CPT | Performed by: INTERNAL MEDICINE

## 2021-04-30 PROCEDURE — 99213 OFFICE O/P EST LOW 20 MIN: CPT | Performed by: INTERNAL MEDICINE

## 2021-04-30 PROCEDURE — 3017F COLORECTAL CA SCREEN DOC REV: CPT | Performed by: INTERNAL MEDICINE

## 2021-04-30 PROCEDURE — 2022F DILAT RTA XM EVC RTNOPTHY: CPT | Performed by: INTERNAL MEDICINE

## 2021-04-30 PROCEDURE — 3052F HG A1C>EQUAL 8.0%<EQUAL 9.0%: CPT | Performed by: INTERNAL MEDICINE

## 2021-04-30 NOTE — PROGRESS NOTES
Subjective:      Patient ID: Indiana Aguilar is a 60 y.o. female. 3-month follow-up on type 1 diabetes insulin pump review downloaded her insulin pump today    Diabetes  She presents for her follow-up diabetic visit. She has type 2 diabetes mellitus. Current diabetic treatment includes insulin pump. Meal planning includes carbohydrate counting. Her overall blood glucose range is 180-200 mg/dl. (Reviewed 2-week pump download average blood sugar 192±47  Basal rate 2.1 units/h  Carb ratio was 5 sensitivity was 20    Lab Results       Component                Value               Date                       LABA1C                   8.2 (H)             04/27/2021              )          Results for Liam Hinton (MRN [id number]) as of 4/30/2021 16:35   Ref.  Range 1/27/2021 08:01 1/29/2021 16:01 4/9/2021 15:31 4/27/2021 08:14 4/30/2021 16:20   Sodium Latest Ref Range: 135 - 144 mEq/L 137   137    Potassium Latest Ref Range: 3.4 - 4.9 mEq/L 4.0   4.0    Chloride Latest Ref Range: 95 - 107 mEq/L 99   98    CO2 Latest Ref Range: 20 - 31 mEq/L 26   25    BUN Latest Ref Range: 6 - 20 mg/dL 15   17    Creatinine Latest Ref Range: 0.50 - 0.90 mg/dL 0.99 (H)   0.99 (H)    Anion Gap Latest Ref Range: 9 - 15 mEq/L 12   14    GFR Non- Latest Ref Range: >60  57.6 (L)   57.6 (L)    GFR  Latest Ref Range: >60  >60.0   >60.0    Glucose Latest Units: mg/dL 200 (H) 235  203 (H) 214   Calcium Latest Ref Range: 8.5 - 9.9 mg/dL 9.2   9.0    Hemoglobin A1C Latest Ref Range: 4.8 - 5.9 % 7.9 (H)   8.2 (H)    TSH Latest Ref Range: 0.440 - 3.860 uIU/mL   1.870     T4 Free Latest Ref Range: 0.84 - 1.68 ng/dL   1.15     Vit D, 25-Hydroxy Latest Ref Range: 30.0 - 100.0 ng/mL   12.9 (L)     Creatinine, Ur Latest Ref Range: Not Established mg/dL 95.6       Microalbumin Creatinine Ratio Latest Ref Range: 0.0 - 30.0 mg/G see below       Microalbumin, Random Urine Latest Ref Range: Not Established mg/dL <1.20 Patient Active Problem List   Diagnosis    Depression    Essential hypertension    Family history of early CAD    Type 2 diabetes mellitus treated with insulin ([email or web address]    BMI 40.0-44.9, adult ([email or web address]    Mixed hyperlipidemia    Snoring    JESSICA (obstructive sleep apnea)    Morbid obesity (HCC)    Chronic fatigue     Allergies   Allergen Reactions    Metformin And Related      gi    Sesame Seed (Diagnostic)     Bactrim [Sulfamethoxazole-Trimethoprim] Rash       Current Outpatient Medications:     HUMALOG 100 UNIT/ML injection vial, USE VIA INSULIN PUMP MAXIMUM DAILY DOSE 100 UNITS, Disp: 90 mL, Rfl: 3    atorvastatin (LIPITOR) 20 MG tablet, TAKE 1 TABLET DAILY, Disp: 90 tablet, Rfl: 0    triamterene-hydroCHLOROthiazide (MAXZIDE-25) 37.5-25 MG per tablet, TAKE 1 TABLET DAILY, Disp: 90 tablet, Rfl: 3    citalopram (CELEXA) 20 MG tablet, TAKE 1 TABLET DAILY, Disp: 90 tablet, Rfl: 3    propranolol (INDERAL) 20 MG tablet, TAKE 1 TABLET TWICE A DAY, Disp: 180 tablet, Rfl: 3    pantoprazole (PROTONIX) 40 MG tablet, TAKE 1 TABLET DAILY, Disp: 90 tablet, Rfl: 3    CPAP Machine MISC, by Does not apply route Change CPAP to Auto CPAP 5-12 cm, Disp: 1 each, Rfl: 0    CPAP Machine MISC, by Does not apply route New CPAP with 9 cm, Disp: 1 each, Rfl: 0    amitriptyline (ELAVIL) 10 MG tablet, TAKE 1 TABLET NIGHTLY, Disp: 90 tablet, Rfl: 3    Insulin Pen Needle (BD PEN NEEDLE MICRO U/F) 32G X 6 MM MISC, USE FOUR TIMES A DAY, Disp: 400 each, Rfl: 3    albuterol sulfate  (90 Base) MCG/ACT inhaler, Inhale 1-2 puffs into the lungs every 4 hours as needed for Wheezing or Shortness of Breath, Disp: 1 Inhaler, Rfl: 0    Insulin Pen Needle (B-D ULTRAFINE III SHORT PEN) 31G X 8 MM MISC, 1 each by Does not apply route daily, Disp: 100 each, Rfl: 3    B-D ULTRA FINE LANCETS MISC, Test daily, Disp: 100 each, Rfl: 1    Glucose Blood (BLOOD GLUCOSE TEST STRIPS) STRP, Test bid and prn, Disp: 100 strip, Rfl: 3    aspirin 81 MG tablet, Take 81 mg by mouth daily, Disp: , Rfl:     Melatonin 1 MG CAPS, Take by mouth, Disp: , Rfl:       Review of Systems   Cardiovascular: Negative. Endocrine: Negative. All other systems reviewed and are negative. Vitals:    04/30/21 1620   BP: 110/74   Pulse: 64   SpO2: 97%   Weight: 242 lb (109.8 kg)   Height: 5' 2\" (1.575 m)       Objective:   Physical Exam  Vitals signs reviewed. Constitutional:       Appearance: Normal appearance. She is obese. HENT:      Head: Normocephalic and atraumatic. Hair is normal.      Right Ear: External ear normal.      Left Ear: External ear normal.      Nose: Nose normal.   Eyes:      General: No scleral icterus. Right eye: No discharge. Left eye: No discharge. Extraocular Movements: Extraocular movements intact. Conjunctiva/sclera: Conjunctivae normal.   Neck:      Musculoskeletal: Normal range of motion and neck supple. Trachea: Trachea normal.   Cardiovascular:      Rate and Rhythm: Normal rate. Musculoskeletal: Normal range of motion. Feet:    Neurological:      General: No focal deficit present. Mental Status: She is alert and oriented to person, place, and time. Psychiatric:         Mood and Affect: Mood normal.         Behavior: Behavior normal.         Assessment:       Diagnosis Orders   1.  Type 2 diabetes mellitus treated with insulin ([facility])  POCT Glucose     DIABETES FOOT EXAM           Plan:      Orders Placed This Encounter   Procedures    POCT Glucose     Orders Placed This Encounter   Procedures    Basic Metabolic Panel     Standing Status:   Future     Standing Expiration Date:   4/30/2022    Hemoglobin A1C     Standing Status:   Future     Standing Expiration Date:   4/30/2022    Microalbumin / Creatinine Urine Ratio     Standing Status:   Future     Standing Expiration Date:   4/30/2022    POCT Glucose     DIABETES FOOT EXAM     Continue insulin via pump as per current setting A1c goal of 7 or lower repeat labs in 3 months time          Jas Remdond MD

## 2021-05-17 ENCOUNTER — TELEPHONE (OUTPATIENT)
Dept: ENDOCRINOLOGY | Age: 56
End: 2021-05-17

## 2021-05-18 ENCOUNTER — TELEPHONE (OUTPATIENT)
Dept: ENDOCRINOLOGY | Age: 56
End: 2021-05-18

## 2021-05-18 NOTE — TELEPHONE ENCOUNTER
Patient called in she said she needs supplies (qiuck set infusion and mini med reservoire) [facility]

## 2021-07-01 ENCOUNTER — OFFICE VISIT (OUTPATIENT)
Dept: PULMONOLOGY | Age: 56
End: 2021-07-01
Payer: COMMERCIAL

## 2021-07-01 VITALS
HEART RATE: 74 BPM | HEIGHT: 62 IN | WEIGHT: 236.8 LBS | SYSTOLIC BLOOD PRESSURE: 120 MMHG | OXYGEN SATURATION: 98 % | DIASTOLIC BLOOD PRESSURE: 57 MMHG | TEMPERATURE: 96.2 F | BODY MASS INDEX: 43.58 KG/M2

## 2021-07-01 DIAGNOSIS — G47.33 OSA (OBSTRUCTIVE SLEEP APNEA): Primary | ICD-10-CM

## 2021-07-01 DIAGNOSIS — E66.01 MORBID OBESITY (HCC): ICD-10-CM

## 2021-07-01 DIAGNOSIS — G47.19 EXCESSIVE DAYTIME SLEEPINESS: ICD-10-CM

## 2021-07-01 PROCEDURE — 99214 OFFICE O/P EST MOD 30 MIN: CPT | Performed by: INTERNAL MEDICINE

## 2021-07-01 PROCEDURE — 3017F COLORECTAL CA SCREEN DOC REV: CPT | Performed by: INTERNAL MEDICINE

## 2021-07-01 PROCEDURE — 1036F TOBACCO NON-USER: CPT | Performed by: INTERNAL MEDICINE

## 2021-07-01 PROCEDURE — G8417 CALC BMI ABV UP PARAM F/U: HCPCS | Performed by: INTERNAL MEDICINE

## 2021-07-01 PROCEDURE — G8427 DOCREV CUR MEDS BY ELIG CLIN: HCPCS | Performed by: INTERNAL MEDICINE

## 2021-07-01 ASSESSMENT — ENCOUNTER SYMPTOMS
SORE THROAT: 0
ABDOMINAL PAIN: 0
DIARRHEA: 0
VOMITING: 0
EYE ITCHING: 0
RHINORRHEA: 0
TROUBLE SWALLOWING: 0
SINUS PRESSURE: 0
VOICE CHANGE: 0
NAUSEA: 0
CHEST TIGHTNESS: 0
EYE DISCHARGE: 0
WHEEZING: 0
COUGH: 0
SHORTNESS OF BREATH: 0

## 2021-07-01 NOTE — PROGRESS NOTES
Subjective:     Maria Del Rosario Albert is a 60 y.o. female who complains today of:     Chief Complaint   Patient presents with    Sleep Apnea     3 month f/u       HPI  She is using Auto CPAP with 5-12  centimeters of H2O with heated humidity. She is using CPAP for about   8  hours every night. She is using CPAP with  Full face  Mask. She said  sleep is restful with the CPAP use. She is compliant with CPAP therapy and benefiting with CPAP use. No snoring with CPAP use. No complaint of daytime sleepiness or tiredness with CPAP use. She denies taking naps. No sleepiness with driving. She denies difficulty falling asleep or staying asleep. I reviewed compliance report with patient regarding CPAP therapy. She is using  CPAP for 30 days out of 30 days  Average usage of days used is 8 hours and 45 min , average AHI 9.8 with CPAP use.          Allergies:  Metformin and related, Sesame seed (diagnostic), and Bactrim [sulfamethoxazole-trimethoprim]  Past Medical History:   Diagnosis Date    Depression     DM2 (diabetes mellitus, type 2) ([facility] [address]     HTN (hypertension)      Past Surgical History:   Procedure Laterality Date    CATARACT REMOVAL Bilateral 2019,      SECTION  1982/3/96    x3    CO COLON CA SCRN NOT HI RSK IND N/A 2017    COLONOSCOPY performed by Marietta Higgins MD polyp needs '25    TONSILLECTOMY  age 8     Family History   Problem Relation Age of Onset   Blase Liming Stroke Mother     Kidney Disease Mother     COPD Mother     Heart Disease Father     Heart Disease Sister     Diabetes Sister     Heart Disease Brother         massive MI    Colon Cancer Neg Hx      Social History     Socioeconomic History    Marital status: Single     Spouse name: Not on file    Number of children: Not on file    Years of education: Not on file    Highest education level: Not on file   Occupational History    Not on file   Tobacco Use    Smoking status: Former Smoker     Packs/day: 2.00 Years: 14.00     Pack years: 28.00     Types: Cigarettes     Start date: 1976     Quit date: 1990     Years since quittin.6    Smokeless tobacco: Never Used   Substance and Sexual Activity    Alcohol use: Not Currently     Alcohol/week: 0.0 standard drinks     Comment: occ    Drug use: No    Sexual activity: Yes   Other Topics Concern    Not on file   Social History Narrative    Not on file     Social Determinants of Health     Financial Resource Strain:     Difficulty of Paying Living Expenses:    Food Insecurity:     Worried About Running Out of Food in the Last Year:     Ran Out of Food in the Last Year:    Transportation Needs:     Lack of Transportation (Medical):  Lack of Transportation (Non-Medical):    Physical Activity:     Days of Exercise per Week:     Minutes of Exercise per Session:    Stress:     Feeling of Stress :    Social Connections:     Frequency of Communication with Friends and Family:     Frequency of Social Gatherings with Friends and Family:     Attends [employer] Services:     Active Member of Clubs or Organizations:     Attends Club or Organization Meetings:     Marital Status:    Intimate Partner Violence:     Fear of Current or Ex-Partner:     Emotionally Abused:     Physically Abused:     Sexually Abused:          Review of Systems   Constitutional: Negative for chills, diaphoresis, fatigue and fever. HENT: Negative for congestion, mouth sores, nosebleeds, postnasal drip, rhinorrhea, sinus pressure, sneezing, sore throat, trouble swallowing and voice change. Eyes: Negative for discharge, itching and visual disturbance. Respiratory: Negative for cough, chest tightness, shortness of breath and wheezing. Cardiovascular: Negative for chest pain, palpitations and leg swelling. Gastrointestinal: Negative for abdominal pain, diarrhea, nausea and vomiting. Genitourinary: Negative for difficulty urinating and hematuria.    Musculoskeletal: Negative for arthralgias, joint swelling and myalgias. Skin: Negative for rash. Allergic/Immunologic: Negative for environmental allergies and food allergies. Neurological: Negative for dizziness, tremors, weakness and headaches. Psychiatric/Behavioral: Negative for behavioral problems and sleep disturbance.         :     Vitals:    07/01/21 1542   BP: (!) 120/57   Site: Right Upper Arm   Position: Sitting   Cuff Size: Large Adult   Pulse: 74   Temp: 96.2 °F (35.7 °C)   SpO2: 98%   Weight: 236 lb 12.8 oz (107.4 kg)   Height: 5' 2\" (1.575 m)     Wt Readings from Last 3 Encounters:   07/01/21 236 lb 12.8 oz (107.4 kg)   04/30/21 242 lb (109.8 kg)   04/09/21 244 lb (110.7 kg)         Physical Exam  Constitutional:       General: She is not in acute distress. Appearance: She is well-developed. She is not diaphoretic. HENT:      Head: Normocephalic and atraumatic. Nose: Nose normal.   Eyes:      Pupils: Pupils are equal, round, and reactive to light. Neck:      Thyroid: No thyromegaly. Vascular: No JVD. Trachea: No tracheal deviation. Cardiovascular:      Rate and Rhythm: Normal rate and regular rhythm. Heart sounds: No murmur heard. No friction rub. No gallop. Pulmonary:      Effort: No respiratory distress. Breath sounds: No wheezing or rales. Chest:      Chest wall: No tenderness. Abdominal:      General: There is no distension. Tenderness: There is no abdominal tenderness. There is no rebound. Musculoskeletal:         General: Normal range of motion. Lymphadenopathy:      Cervical: No cervical adenopathy. Skin:     General: Skin is warm and dry. Neurological:      Mental Status: She is alert and oriented to person, place, and time.       Coordination: Coordination normal.         Current Outpatient Medications   Medication Sig Dispense Refill    atorvastatin (LIPITOR) 20 MG tablet TAKE 1 TABLET DAILY 90 tablet 3    HUMALOG 100 UNIT/ML injection vial USE VIA INSULIN PUMP MAXIMUM DAILY DOSE 100 UNITS 90 mL 3    triamterene-hydroCHLOROthiazide (MAXZIDE-25) 37.5-25 MG per tablet TAKE 1 TABLET DAILY 90 tablet 3    citalopram (CELEXA) 20 MG tablet TAKE 1 TABLET DAILY 90 tablet 3    propranolol (INDERAL) 20 MG tablet TAKE 1 TABLET TWICE A  tablet 3    pantoprazole (PROTONIX) 40 MG tablet TAKE 1 TABLET DAILY 90 tablet 3    CPAP Machine MISC by Does not apply route Change CPAP to Auto CPAP 5-12 cm 1 each 0    CPAP Machine MISC by Does not apply route New CPAP with 9 cm 1 each 0    amitriptyline (ELAVIL) 10 MG tablet TAKE 1 TABLET NIGHTLY 90 tablet 3    albuterol sulfate  (90 Base) MCG/ACT inhaler Inhale 1-2 puffs into the lungs every 4 hours as needed for Wheezing or Shortness of Breath 1 Inhaler 0    Insulin Pen Needle (B-D ULTRAFINE III SHORT PEN) 31G X 8 MM MISC 1 each by Does not apply route daily 100 each 3    B-D ULTRA FINE LANCETS MISC Test daily 100 each 1    Glucose Blood (BLOOD GLUCOSE TEST STRIPS) STRP Test bid and prn 100 strip 3    aspirin 81 MG tablet Take 81 mg by mouth daily      Melatonin 1 MG CAPS Take by mouth      Insulin Pen Needle (BD PEN NEEDLE MICRO U/F) 32G X 6 MM MISC USE FOUR TIMES A  each 3     No current facility-administered medications for this visit. Assessment/Plan:     1. JESSICA (obstructive sleep apnea)  She is using Auto CPAP with 5-12  centimeters of H2O with heated humidity. She is using CPAP for about   8  hours every night. She is using CPAP with  Full face  Mask. She said  sleep is restful with the CPAP use. She is compliant with CPAP therapy and benefiting with CPAP use. No snoring with CPAP use. Continue CPAP therapy as before    I reviewed compliance report with patient regarding CPAP therapy. She is using  CPAP for 30 days out of 30 days  Average usage of days used is 8 hours and 45 min , average AHI 9.8 with CPAP use. Discussed about recall on  CPAP.   She has a ResMed unit which does not have recall    Counseling: CPAP/BiPAP uses, She advised to use CPAP at least 5-6 hours every night. Driving: She is advised for extreme caution when driving or operating machinery if there is a feeling of drowsiness, especially while driving it is preferable to stop driving and take a brief nap. Sleep hygiene:Avoid supine sleep, sleep on  sides. Avoid  sleep deprivation. Explained sleep hygiene. Advice to avoid Alcohol and sedative    Time spend over 30 min. Face to face. with greater than 50 % time with CPAP therapy including review compliance, counseling and advised regarding CPAP therapy. 2. Morbid obesity ([address]  She is advised try to lose weight. obesity related risk explained to the patient ,  Current weight:  236 lb 12.8 oz (107.4 kg) Lbs. BMI:  Body mass index is 43.31 kg/m². Suggested weight control approaches, including dietary changes , exercise, behavioral modification.     Moving out of state , no followup given       Sirena Alvarez MD